# Patient Record
Sex: FEMALE | Race: BLACK OR AFRICAN AMERICAN
[De-identification: names, ages, dates, MRNs, and addresses within clinical notes are randomized per-mention and may not be internally consistent; named-entity substitution may affect disease eponyms.]

---

## 2020-11-10 ENCOUNTER — HOSPITAL ENCOUNTER (EMERGENCY)
Dept: HOSPITAL 97 - ER | Age: 30
Discharge: HOME | End: 2020-11-10
Payer: COMMERCIAL

## 2020-11-10 VITALS — TEMPERATURE: 97.3 F

## 2020-11-10 VITALS — OXYGEN SATURATION: 98 % | SYSTOLIC BLOOD PRESSURE: 119 MMHG | DIASTOLIC BLOOD PRESSURE: 70 MMHG

## 2020-11-10 DIAGNOSIS — J20.9: Primary | ICD-10-CM

## 2020-11-10 DIAGNOSIS — Z20.828: ICD-10-CM

## 2020-11-10 PROCEDURE — 71045 X-RAY EXAM CHEST 1 VIEW: CPT

## 2020-11-10 PROCEDURE — 87804 INFLUENZA ASSAY W/OPTIC: CPT

## 2020-11-10 PROCEDURE — 93005 ELECTROCARDIOGRAM TRACING: CPT

## 2020-11-10 PROCEDURE — 99285 EMERGENCY DEPT VISIT HI MDM: CPT

## 2020-11-10 PROCEDURE — 96372 THER/PROPH/DIAG INJ SC/IM: CPT

## 2020-11-10 NOTE — EDPHYS
Physician Documentation                                                                           

 [facility]                                                                 

Name: Bozena Crenshaw                                                                             

Age: 30 yrs                                                                                       

Sex: Female                                                                                       

: 1990                                                                                   

MRN: [id number]                                                                                   

Arrival Date: 11/10/2020                                                                          

Time: 10:46                                                                                       

Account#: [id number]                                                                            

Bed 18                                                                                            

Private MD:                                                                                       

ED Physician Scooter Yeager                                                                      

HPI:                                                                                              

11/10                                                                                             

11:57 This 30 yrs old Black Female presents to ER via Wheelchair with complaints of Chest     snw 

      Pain.                                                                                       

11:57 Onset: The symptoms/episode began/occurred suddenly, last pm, pt states she feels like  snw 

      she is having an anxiety attack but her chest is way tighter. Associated signs and          

      symptoms: The patient has no apparent associated signs or symptoms. Modifying factors:      

      The patient symptoms are alleviated by nothing. The patient has not experienced similar     

      symptoms in the past. It is unknown whether or not the patient has recently seen a          

      physician.                                                                                  

                                                                                                  

OB/GYN:                                                                                           

11:02 LMP 2020                                                                           ca1 

                                                                                                  

Historical:                                                                                       

- Allergies:                                                                                      

11:02 "anesthesia";                                                                           ca1 

- Home Meds:                                                                                      

11:02 None [Active];                                                                          ca1 

- PMHx:                                                                                           

11:02 None;                                                                                   ca1 

- PSHx:                                                                                           

11:02 Tubal ligation;                                                                         ca1 

                                                                                                  

- Immunization history:: Adult Immunizations up to date, Flu vaccine is not up to date.           

- Social history:: Smoking status: Patient denies any tobacco usage or history of.                

                                                                                                  

                                                                                                  

ROS:                                                                                              

11:57 Constitutional: Negative for fever, chills, and weight loss, Eyes: Negative for injury, snw 

      pain, redness, and discharge, ENT: Negative for injury, pain, and discharge, Neck:          

      Negative for injury, pain, and swelling, Respiratory: Negative for cough, wheezing,         

      positive for shortness of breath and pleuritic chest pain, Abdomen/GI: Negative for         

      abdominal pain, nausea, vomiting, diarrhea, and constipation, Back: Negative for injury     

      and pain, : Negative for injury, bleeding, discharge, and swelling, MS/Extremity:         

      Negative for injury and deformity, Skin: Negative for injury, rash, and discoloration,      

      Neuro: Negative for headache, weakness, numbness, tingling, and seizure, Psych:             

      Negative for depression, anxiety, suicide ideation, homicidal ideation, and                 

      hallucinations.                                                                             

11:57 Cardiovascular: Positive for chest pain, of the anterior aspect of right upper chest.       

                                                                                                  

Exam:                                                                                             

11:57 Constitutional:  This is a well developed, well nourished patient who is awake, alert,  snw 

      and in no acute distress. Head/Face:  Normocephalic, atraumatic. Eyes:  Pupils equal        

      round and reactive to light, extra-ocular motions intact.  Lids and lashes normal.          

      Conjunctiva and sclera are non-icteric and not injected.  Cornea within normal limits.      

      Periorbital areas with no swelling, redness, or edema. ENT:  Nares patent. No nasal         

      discharge, no septal abnormalities noted.  Tympanic membranes are normal and external       

      auditory canals are clear.  Oropharynx with no redness, swelling, or masses, exudates,      

      or evidence of obstruction, uvula midline.  Mucous membranes moist. Neck:  Trachea          

      midline, no thyromegaly or masses palpated, and no cervical lymphadenopathy.  Supple,       

      full range of motion without nuchal rigidity, or vertebral point tenderness.  No            

      Meningismus. Chest/axilla:  Normal chest wall appearance and motion.  Nontender with no     

      deformity.  No lesions are appreciated. Cardiovascular:  Regular rate and rhythm with a     

      normal S1 and S2.  No gallops, murmurs, or rubs.  Normal PMI, no JVD.  No pulse             

      deficits. Respiratory:  Lungs have equal breath sounds bilaterally, clear to                

      auscultation and percussion.  No rales, rhonchi or wheezes noted.  No increased work of     

      breathing, no retractions or nasal flaring. Abdomen/GI:  Soft, non-tender, with normal      

      bowel sounds.  No distension or tympany.  No guarding or rebound.  No evidence of           

      tenderness throughout. Back:  No spinal tenderness.  No costovertebral tenderness.          

      Full range of motion. Skin:  Warm, dry with normal turgor.  Normal color with no            

      rashes, no lesions, and no evidence of cellulitis. MS/ Extremity:  Pulses equal, no         

      cyanosis.  Neurovascular intact.  Full, normal range of motion. Neuro:  Awake and           

      alert, GCS 15, oriented to person, place, time, and situation.  Cranial nerves II-XII       

      grossly intact.  Motor strength 5/5 in all extremities.  Sensory grossly intact.            

      Cerebellar exam normal.  Normal gait. Psych:  Awake, alert, with orientation to person,     

      place and time.  Behavior, mood, and affect are within normal limits.                       

                                                                                                  

Vital Signs:                                                                                      

10:58  / 89; Pulse 95; Resp 16 S; Temp 97.3(TE); Pulse Ox 100% on R/A; Weight 86.18 kg  ca1 

      (R); Height 5 ft. 0 in. (152.40 cm) (R); Pain 10/10;                                        

12:00  / 72; Pulse 86; Resp 16; Pulse Ox 100% on R/A;                                   ll2 

13:00  / 70; Pulse 76; Resp 16; Pulse Ox 98% on R/A;                                    ll2 

13:45  / 73; Pulse 81; Resp 17; Pulse Ox 99% ;                                          ll2 

10:58 Body Mass Index 37.11 (86.18 kg, 152.40 cm)                                             ca1 

                                                                                                  

MDM:                                                                                              

11:13 Patient medically screened.                                                             lea 

15:48 Data reviewed: vital signs, nurses notes. Data interpreted: Pulse oximetry: on room air snw 

      is 99 %. Interpretation: normal. Counseling: I had a detailed discussion with the           

      patient and/or guardian regarding: the historical points, exam findings, and any            

      diagnostic results supporting the discharge/admit diagnosis, lab results, the need for      

      outpatient follow up, for definitive care. Response to treatment: the patient's             

      symptoms have markedly improved after treatment.                                            

                                                                                                  

11/10                                                                                             

11:13 Order name: Flu; Complete Time: 12:23                                                   snw 

11/10                                                                                             

11:13 Order name: COVID-19                                                                    snw 

11/10                                                                                             

11:13 Order name: Chest Single View XRAY; Complete Time: 11:47                                snw 

11/10                                                                                             

11:13 Order name: EKG; Complete Time: 11:14                                                   snw 

11/10                                                                                             

11:13 Order name: EKG - Nurse/Tech; Complete Time: 12:04                                      snw 

                                                                                                  

EC:55 Rate is 79 beats/min. Rhythm is regular. QRS Axis is Normal. KS interval is normal. QRS snw 

      interval is normal. QT interval is normal. T waves are Normal. Clinical impression:         

      Normal ECG.                                                                                 

                                                                                                  

Administered Medications:                                                                         

12:16 Drug: Pepcid 20 mg Route: PO;                                                           ll2 

13:35 Follow up: Response: No adverse reaction                                                ll2 

12:16 Drug: ZyrTEC - Cetirizine 10 mg Route: PO;                                              ll2 

13:35 Follow up: Response: No adverse reaction                                                ll2 

12:17 Drug: Decadron 10 mg Route: IM; Site: right gluteus;                                    ll2 

13:35 Follow up: Response: No adverse reaction                                                ll2 

12:17 Drug: Zithromax 500 mg Route: PO;                                                       ll2 

13:35 Follow up: Response: No adverse reaction                                                ll2 

                                                                                                  

                                                                                                  

Disposition:                                                                                      

                                                                                             

06:56 Co-signature as Attending Physician, Scooter Yeager MD I agree with the assessment and  lea 

      plan of care.                                                                               

                                                                                                  

Disposition:                                                                                      

11/10/20 13:44 Discharged to Home. Impression: Acute bronchitis, Chest pain, unspecified.         

- Condition is Stable.                                                                            

- Discharge Instructions: Acute Bronchitis, Adult, Nonspecific Chest Pain.                        

- Prescriptions for Zyrtec 10 mg Oral Tablet - take 1 tablet by ORAL route once daily             

  As needed; 20 tablet. Tessalon Perles 100 mg Oral Capsule - take 1 capsule by ORAL              

  route every 8 hours As needed; 15 capsule. Prednisone 20 mg Oral Tablet - take 2                

  tablet by ORAL route once daily for 5 days; 10 tablet. Albuterol Sulfate 90                     

  mcg/actuation - inhale 1-2 puff by INHALATION route every 4-6 hours; 1 Inhaler.                 

  Pepcid 20 mg Oral Tablet - take 1 tablet by ORAL route once daily; 20 tablet.                   

  Zithromax 500 mg Oral Tablet - take 1 tablet by ORAL route once daily for 5 days; 5             

  tablet.                                                                                         

- Work release form, Medication Reconciliation Form, Thank You Letter, Antibiotic                 

  Education, Prescription Opioid Use form.                                                        

- Follow up: Emergency Department; When: As needed; Reason: Worsening of condition.               

  Follow up: Private Physician; When: 2 - 3 days; Reason: Recheck today's complaints,             

  Continuance of care, Re-evaluation by your physician.                                           

                                                                                                  

                                                                                                  

                                                                                                  

Signatures:                                                                                       

Dispatcher [employer]                           Scooter Elliott MD MD cha Waters, Shelly, TEENAP-C                   FNP-Migdaliaw                                                  

Olivia Abbott RN RN ca1 Linscombe, Lacie, RN                    RN   ll2                                                  

                                                                                                  

Corrections: (The following items were deleted from the chart)                                    

11/10                                                                                             

14:05 13:44 11/10/2020 13:44 Discharged to Home. Impression: Acute bronchitis; Chest pain,    ll2 

      unspecified. Condition is Stable. Forms are Medication Reconciliation Form, Thank You       

      Letter, Antibiotic Education, Prescription Opioid Use. Follow up: Emergency Department;     

      When: As needed; Reason: Worsening of condition. Follow up: Private Physician; When: 2      

      - 3 days; Reason: Recheck today's complaints, Continuance of care, Re-evaluation by         

      your physician. snw                                                                         

                                                                                                  

**************************************************************************************************

## 2020-11-10 NOTE — ER
Nurse's Notes                                                                                     

 [facility]                                                                 

Name: Bozena Crenshaw                                                                             

Age: 30 yrs                                                                                       

Sex: Female                                                                                       

: 1990                                                                                   

MRN: [id number]                                                                                   

Arrival Date: 11/10/2020                                                                          

Time: 10:46                                                                                       

Account#: [id number]                                                                            

Bed 18                                                                                            

Private MD:                                                                                       

Diagnosis: Acute bronchitis;Chest pain, unspecified                                               

                                                                                                  

Presentation:                                                                                     

11/10                                                                                             

10:58 Chief complaint: Patient states: Chest pain since last night. States, " my chest hurts  ca1 

      feels like I can't breathe and I have a headache. Everything I do just like walking         

      right here takes all the energy I have" Denies cough, fever. Denies injury. Denies hx       

      of heart conditions. Pain is constant, worse with movement and breathing in.                

      Coronavirus screen: Client denies travel out of the [place] in the last 14 days. fatigue,      

      shortness of breath, Client presents with at least one sign or symptom that may             

      indicate coronavirus-19. Standard/surgical mask placed on the client. Provider              

      contacted for isolation considerations. The client denies any previous COVID testing.       

      Ebola Screen: Patient negative for fever greater than or equal to 101.5 degrees             

      Fahrenheit, and additional compatible Ebola Virus Disease symptoms Patient denies           

      exposure to infectious person. Patient denies travel to an Ebola-affected area in the       

      21 days before illness onset. No symptoms or risks identified at this time. Initial         

      Sepsis Screen: Does the patient meet any 2 criteria? No. Patient's initial sepsis           

      screen is negative. Does the patient have a suspected source of infection? No.              

      Patient's initial sepsis screen is negative. Risk Assessment: Do you want to hurt           

      yourself or someone else? Patient reports no desire to harm self or others. Onset of        

      symptoms was November 10, 2020.                                                             

10:58 Method Of Arrival: Wheelchair                                                           ca1 

10:58 Acuity: MALIKA 3                                                                           ca1 

                                                                                                  

OB/GYN:                                                                                           

11:02 LMP 2020                                                                           ca1 

                                                                                                  

Historical:                                                                                       

- Allergies:                                                                                      

11:02 "anesthesia";                                                                           ca1 

- Home Meds:                                                                                      

11:02 None [Active];                                                                          ca1 

- PMHx:                                                                                           

11:02 None;                                                                                   ca1 

- PSHx:                                                                                           

11:02 Tubal ligation;                                                                         ca1 

                                                                                                  

- Immunization history:: Adult Immunizations up to date, Flu vaccine is not up to date.           

- Social history:: Smoking status: Patient denies any tobacco usage or history of.                

                                                                                                  

                                                                                                  

Screenin:10 Abuse screen: Denies threats or abuse. Denies injuries from another. Nutritional        jl7 

      screening: No deficits noted. Tuberculosis screening: No symptoms or risk factors           

      identified. Fall Risk Gait- Weak (10 pts.). Total Goodman Fall Scale indicates No Risk        

      (0-24 pts).                                                                                 

                                                                                                  

Assessment:                                                                                       

11:00 General: Appears in no apparent distress. uncomfortable, Behavior is calm, cooperative, ll2 

      appropriate for age. Pain: Complains of pain in chest and anterior aspect of right          

      upper chest.                                                                                

11:00 Pain: Pain does not radiate. Pain began 1 day ago. Neuro: Level of Consciousness is     ll2 

      awake, alert, obeys commands, Oriented to person, place, time, situation.                   

      Cardiovascular: Patient's skin is warm and dry. Respiratory: Airway is patent               

      Respiratory effort is even, unlabored, Respiratory pattern is regular, symmetrical. GI:     

      No signs and/or symptoms were reported involving the gastrointestinal system. : No        

      signs and/or symptoms were reported regarding the genitourinary system. EENT: No signs      

      and/or symptoms were reported regarding the EENT system. Derm: Skin is intact, is           

      healthy with good turgor, Skin is dry, Skin is normal, Skin temperature is warm.            

      Musculoskeletal: Circulation, motion, and sensation intact. Range of motion: intact in      

      all extremities.                                                                            

12:00 Reassessment: Patient and/or family updated on plan of care and expected duration. Pain ll2 

      level reassessed. Patient is alert, oriented x 3, equal unlabored respirations, skin        

      warm/dry/pink.                                                                              

13:34 Reassessment: Patient and/or family updated on plan of care and expected duration. Pain ll2 

      level reassessed. Patient is alert, oriented x 3, equal unlabored respirations, skin        

      warm/dry/pink.                                                                              

13:41 Reassessment: PT STATES HER PAIN IS DECREASED AND SHE FEELS A BETTER AFTER THE          ll2 

      MEDICATIONS ADMINISTERED. ERP NOTIFIED.                                                     

                                                                                                  

Vital Signs:                                                                                      

10:58  / 89; Pulse 95; Resp 16 S; Temp 97.3(TE); Pulse Ox 100% on R/A; Weight 86.18 kg  ca1 

      (R); Height 5 ft. 0 in. (152.40 cm) (R); Pain 10/10;                                        

12:00  / 72; Pulse 86; Resp 16; Pulse Ox 100% on R/A;                                   ll2 

13:00  / 70; Pulse 76; Resp 16; Pulse Ox 98% on R/A;                                    ll2 

13:45  / 73; Pulse 81; Resp 17; Pulse Ox 99% ;                                          ll2 

10:58 Body Mass Index 37.11 (86.18 kg, 152.40 cm)                                             ca1 

                                                                                                  

ED Course:                                                                                        

10:46 Patient arrived in ED.                                                                  ag5 

11:01 Triage completed.                                                                       ca1 

11:02 Arm band placed on right wrist.                                                         ca1 

11:03 Negar Villalobos FNP-C is PHCP.                                                          snw 

11:03 Scooter Yeager MD is Attending Physician.                                             snw 

11:21 Peggy Ashby RN is Primary Nurse.                                                  ll2 

11:35 EKG done, by ED staff, reviewed by Negar METZGER.                                 jl7 

11:36 Chest Single View XRAY In Process Unspecified.                                          EDMS

12:10 Patient has correct armband on for positive identification. Placed in gown. Bed in low  jl7 

      position. Call light in reach. Side rails up X2. Cardiac monitor on. Pulse ox on. NIBP      

      on.                                                                                         

12:10 Patient maintains SpO2 saturation greater than 95% on room air.                         jl7 

14:00 No provider procedures requiring assistance completed. Patient did not have IV access   ll2 

      during this emergency room visit.                                                           

                                                                                                  

Administered Medications:                                                                         

12:16 Drug: Pepcid 20 mg Route: PO;                                                           ll2 

13:35 Follow up: Response: No adverse reaction                                                ll2 

12:16 Drug: ZyrTEC - Cetirizine 10 mg Route: PO;                                              ll2 

13:35 Follow up: Response: No adverse reaction                                                ll2 

12:17 Drug: Decadron 10 mg Route: IM; Site: right gluteus;                                    ll2 

13:35 Follow up: Response: No adverse reaction                                                ll2 

12:17 Drug: Zithromax 500 mg Route: PO;                                                       ll2 

13:35 Follow up: Response: No adverse reaction                                                ll2 

                                                                                                  

                                                                                                  

Outcome:                                                                                          

13:44 Discharge ordered by MD.                                                                snw 

14:00 Discharged to home ambulatory.                                                          ll2 

14:00 Condition: stable                                                                           

14:00 Discharge instructions given to patient, Instructed on discharge instructions, follow       

      up and referral plans. medication usage.                                                    

14:05 Patient left the ED.                                                                    ll2 

                                                                                                  

Addendum:                                                                                         

2020                                                                                        

     12:31 Addendum: COVID-19 Result: Negative result given to RN to notify pt. Contacted by: Soraida Infante. Notified pt of negative COVID 19 swab results. Pt advised that even with a      

           negative test result they should remain in isolation until symptom free for 3 days     

           without medication. Pt also advised to return to the ED for worsening symptoms.        

                                                                                                  

Signatures:                                                                                       

Dispatcher [employer]                           EDMS                                                 

Whitney Infante, Negar Camarena RN, FNP-C                   FNP-Csnw                                                  

Jasmin Vital RN                        RN   jl7                                                  

Olivia Abbott RN                        RN   ca1                                                  

Jyoti Duran                                5                                                  

Peggy Ashby RN                    RN   ll2                                                  

                                                                                                  

**************************************************************************************************

## 2020-11-10 NOTE — RAD REPORT
EXAM DESCRIPTION:  RAD - Chest Single View - 11/10/2020 11:36 am

 

CLINICAL HISTORY:  CHEST PAIN

Chest pain.

 

COMPARISON:  CHEST SINGLE VIEW dated 10/27/2009

 

FINDINGS:  Portable technique limits examination quality.

 

The lungs are grossly clear. The heart is normal in size. No displaced fractures.

 

IMPRESSION:  No acute intrathoracic process suspected.

## 2020-11-11 NOTE — EKG
Test Date:    2020-11-10               Test Time:    11:48:51

Technician:   FRANCESCO                                    

                                                     

MEASUREMENT RESULTS:                                       

Intervals:                                           

Rate:         79                                     

NJ:           162                                    

QRSD:         84                                     

QT:           348                                    

QTc:          399                                    

Axis:                                                

P:                                                   

NJ:           162                                    

QRS:          52                                     

T:            45                                     

                                                     

INTERPRETIVE STATEMENTS:                                       

                                                     

Normal sinus rhythm

Normal ECG

Compared to ECG 01/02/2012 00:02:04

Sinus arrhythmia no longer present



Electronically Signed On 11-11-20 07:24:29 CST by Anil Smith

## 2024-12-16 ENCOUNTER — HOSPITAL ENCOUNTER (EMERGENCY)
Dept: HOSPITAL 97 - ER | Age: 34
Discharge: HOME | End: 2024-12-16
Payer: COMMERCIAL

## 2024-12-16 VITALS — TEMPERATURE: 98 F

## 2024-12-16 VITALS — OXYGEN SATURATION: 100 %

## 2024-12-16 VITALS — SYSTOLIC BLOOD PRESSURE: 123 MMHG | DIASTOLIC BLOOD PRESSURE: 87 MMHG

## 2024-12-16 DIAGNOSIS — K80.80: Primary | ICD-10-CM

## 2024-12-16 LAB
ALBUMIN SERPL BCP-MCNC: 3.7 G/DL (ref 3.4–5)
ALBUMIN/GLOB SERPL: 1.1 {RATIO} (ref 1.1–1.8)
ALP SERPL-CCNC: 81 U/L (ref 45–117)
ALT SERPL W P-5'-P-CCNC: 38 U/L (ref 13–56)
ANION GAP SERPL CALC-SCNC: 9.6 MEQ/L (ref 5–15)
AST SERPL W P-5'-P-CCNC: 43 U/L (ref 15–37)
BUN BLD-MCNC: 12 MG/DL (ref 7–18)
GLOBULIN SER CALC-MCNC: 3.4 G/DL (ref 2.3–3.5)
GLUCOSE SERPLBLD-MCNC: 124 MG/DL (ref 74–106)
HCT VFR BLD CALC: 42.9 % (ref 36–45)
HGB BLD-MCNC: 14.6 G/DL (ref 12–15)
LIPASE SERPL-CCNC: 21 U/L (ref 13–75)
LYMPHOCYTES # SPEC AUTO: 2.7 K/UL (ref 0.7–4.9)
MCH RBC QN AUTO: 29.5 PG (ref 27–35)
MCHC RBC AUTO-ENTMCNC: 34.1 G/DL (ref 32–36)
MCV RBC: 86.5 FL (ref 80–100)
NRBC # BLD: 0 10*3/UL (ref 0–0)
NRBC BLD AUTO-RTO: 0.1 % (ref 0–0)
PMV BLD: 7.6 FL (ref 7.6–11.3)
POTASSIUM SERPL-SCNC: 3.6 MEQ/L (ref 3.5–5.1)
RBC # BLD: 4.96 M/UL (ref 3.86–4.86)
SQUAMOUS URNS QL MICRO: <5 /HPF
UA COMPLETE W REFLEX CULTURE PNL UR: (no result)
UA DIPSTICK W REFLEX MICRO PNL UR: (no result)
WBC # BLD AUTO: 12.1 THOU/UL (ref 4.3–10.9)

## 2024-12-16 PROCEDURE — 80053 COMPREHEN METABOLIC PANEL: CPT

## 2024-12-16 PROCEDURE — 81025 URINE PREGNANCY TEST: CPT

## 2024-12-16 PROCEDURE — 96375 TX/PRO/DX INJ NEW DRUG ADDON: CPT

## 2024-12-16 PROCEDURE — 96372 THER/PROPH/DIAG INJ SC/IM: CPT

## 2024-12-16 PROCEDURE — 81001 URINALYSIS AUTO W/SCOPE: CPT

## 2024-12-16 PROCEDURE — 83690 ASSAY OF LIPASE: CPT

## 2024-12-16 PROCEDURE — 96374 THER/PROPH/DIAG INJ IV PUSH: CPT

## 2024-12-16 PROCEDURE — 96361 HYDRATE IV INFUSION ADD-ON: CPT

## 2024-12-16 PROCEDURE — 85025 COMPLETE CBC W/AUTO DIFF WBC: CPT

## 2024-12-16 PROCEDURE — 74177 CT ABD & PELVIS W/CONTRAST: CPT

## 2024-12-16 PROCEDURE — 99284 EMERGENCY DEPT VISIT MOD MDM: CPT

## 2024-12-16 PROCEDURE — 36415 COLL VENOUS BLD VENIPUNCTURE: CPT

## 2024-12-16 NOTE — XMS REPORT
Continuity of Care Document



                          Created on: 2024





VICKEY CRENSHAW

External Reference #: [id number]

: 1990

Sex: Female



Demographics





                                        [place]  [address]

 

                                        Home Phone          [phone]

 

                                        Work Phone          [phone]

 

                                        Mobile Phone        [phone]

 

                                        Email Address       DECLINED

 

                                        Preferred Language  English

 

                                        Marital Status      Unknown

 

                                        Taoist Affiliation Unknown

 

                                        Race                Unknown

 

                                        Additional Race(s)  Unavailable

White

 

                                        Ethnic Group        Unknown





Author





                                        Name                Unknown

 

                                        Address             [address]

[place]  [address]

 

                                        [facility]

[facility]connect

 

                                        Address             [address]

[address]

[place]  [address]

 

                                        Phone               [phone]





Support





                          Name         Relationship Address      Phone

 

                                Vickey Crenshaw Personal Relationship [address], [place]  [address] [phone]

 

                                Karlee Crenshaw  Emergency Contact [address] [place]  [address]                     [phone]

 

                                Vickey Crenshaw Personal Relationship [address] [place]  [address]                 [phone]

 

                                Unavailable     Personal Relationship [address] [place]  [address]                      Unavailable

 

                                1               JALAYAH         [address] [place]  [address]                      Unavailable

 

                                2               Personal Relationship [address] [place]  [address]                      Unavailable

 

                          PING CRENSHAW DTR          Unknown      Unavailabl

e

 

                                CHANEL CRENSHAW               [address]

[place]  [address]                     [phone]





Care Team Providers





                                Care Team Member Name Role            Phone

 

                                ARTHUR LUCIANO Primary Care Physician Unavaila

Niyah Trejo  Attending Clinician Unavailable

 

                                RUFINA SEQUEIRA Attending Clinician Unavailab

EDWIN Potter Attending Clinician Unavailable

 

                                STEFAN DUNBAR    Attending Clinician Unavailable

 

                                DENISE HAJI   Attending Clinician Unavailable

 

                                PAULINA OLGUIN Attending Clinician UnaTOMMIE Myers Attending Clinician Unavailab

ADINA Portillo Attending Clinician Unavaila

SMITHA Espitia    Attending Clinician Unavailable

 

                                LAB45           Attending Clinician Unavailable

 

                                ELLIOTT PINO Attending Clinician Un

available

 

                                MD EITAN Attending Clinician Unavailab

FREDDY Ross Attending Clinician Unavaila

ble

 

                                LAB90           Attending Clinician Unavailable

 

                                LEROY WAGNER   Attending Clinician Unavailable

 

                                Leander Holcomb MD Attending Clinician [phone]

 

                                Leroy Wagner DO Attending Clinician [phone]

 

                                GC_GCBZW_Shellya_S Attending Clinician UnavailCHANTELLE Yao Attending Clinician UnavailSTEF Ames  Attending Clinician Unavailable

 

                                Rian DICKINSONP, Stef DUPONT Attending Clinician [phone]

[phone]

 

                                Doctor Unassigned, No Name Attending Clinician U

MAGDALENE Ling Attending Clinician Unavailwilian Peralta FNP, Magdalene GUERRERO Attending Clinician [phone]

 

                                [facility] Yris DUKE Attending Clinician +

[phone]

 

                                YRIS AGUIRRE Attending Clinician Unavail

leonides Hernandes FNP, Richy DUPONT Attending Clinician +

[phone]

 

                                Lab, [facility] I Attending Clinician Unavailab Lopez, Pau LAY Attending Clinician +

[phone]

 

                                PAU HEALY Attending Clinician Unavailab

Nidia Dodson Attending Clinician [phone]



 

                                NIDIA MADDEN Attending Clinician Unavailable

 

                                GABRIELA COTTER Attending Clinician Unavail

able

 

                                TERRENCE WU Attending Clinician UnavailHAMIDA Layton Attending Clinician LEROY Maurer   Admitting Clinician Unavailable

 

                                Leroy Wagner DO Admitting Clinician [phone]

 

                                GC_GCBZW_Shellya_S Admitting Clinician STEF Larry  Admitting Clinician Unavailable

 

                                GABRIELA COTTER Admitting Clinician Unavail

TERRENCE Ov[place]o Admitting Clinician HAMIDA Rhodes Admitting Clinician Juana toussaint







Payers





                    Payer Name Policy Type Policy Number Effective Date Expirati

on Date Source

 

                                                    [employer] [facility] 

S O  [address] ON     9                   [id number]        2023 

00:00:00                                            

 

                                                    [employer] 

OUT OF NETWORK                          [id number]        2023 

00:00:00                                            

 

                          [facility]                 [id number]    2021 

00:00:00                                            







Problems





                                                    Condition 

Name                                    Condition 

Details                                 Condition 

Category                  Status                    Onset 

Date                                    Resolution 

Date                                    Last 

Treatment 

Date                                    Treating 

Clinician                 Comments                  Source

 

                                                    Abdominal 

pain, 

unspecifie

d 

abdominal 

location                                Abdominal 

pain, 

unspecifie

d 

abdominal 

location            Disease             Active              2023 

00:00:

00                                                               [facility]

 

                                                    History of 

tubal 

ligation                                History of 

tubal 

ligation            Disease             Active               

00:00:

00                                                              Overview: 

Formattin

g of this 

note 

might be 

different 

from the 

original.

                                    [facility]

 

                                                    Hydrosalpi

nx                                      Hydrosalpi

nx                  Disease             Active               

00:00:

00                                                               [facility]

 

                                                    Pseudochol

inesterase 

deficiency                              Pseudochol

inesterase 

deficiency                Disease                   Recurre

nce                                      

00:00:

00                                                               [facility]

 

                                                    Morbid 

obesity                                 Morbid 

obesity             Disease             Active               

00:00:

00                                                               [facility]

 

                                        [id number]           BMI 

40.0-44.9, 

adult   Problem Active                                          [facility]

 

                                        [id number]            PTSD 

(post-trau

matic 

stress 

disorder) Problem Active                                          [facility]

 

            [id number] Pica  Problem Active                               [facility]

 

                                        [id number]           History of 

uterine 

fibroid Problem Active                                          [facility]

 

                                        [id number]           Pure 

hyperchole

sterolemia Problem Active                                          [facility]







Allergies, Adverse Reactions, Alerts





                                                    Allergy 

Name                                    Allergy 

Type            Status          Severity        Reaction(s)     Onset 

Date                                    Inactive 

Date                                    Treating 

Clinician                 Comments                  Source

 

                                                    Succinyl

choline                                 Propensi

ty to 

adverse 

reaction

s               Active                          Other            

00:00:

00                                                          Pseudocho

linestera

se 

deficienc

y 

(Prolonge

d 

paralysis 

of 1-2 

hours)                                  [facility] 

- 

Externa

l

 

                                                    SUCCINYL

CHOLINE                                 DRUG 

INGREDI         Active          High            Other-Cmnt       

00:00:

00                                                              [facility]

 

                                                    Succinyl

choline                                 Propensi

ty to 

adverse 

reaction

s to 

drug                Active                                  Other - See 

comments                                 

00:00:

00                                                          Pseudocho

linestera

se 

deficienc

y 

(Prolonge

d 

paralysis 

of 1-2 

hours)                                  [facility]







Social History





                    Social Habit Start Date Stop Date Quantity  Comments  Source

 

                    Sexual orientation                                         [facility] - 

External

 

                    ASSERTION                     Not pregnant           [facility] - 

External

 

                                                    History SDOH 

Alcohol Frequency                                                     [facility]

 

                                                    History SDOH 

Alcohol Std Drinks                                                     [facility]

 

                                                    History SDOH 

Alcohol Binge                                                     [facility]

 

                                                    Exposure to 

SARS-CoV-2 (event)                           Not sure                  [facility]

 

                                                    History of Tobacco 

Use                                                              [facility]

 

[facility]

 

                                                    Alcoholic beverage 

intake                                  2024 

00:00:00                                2024 

00:00:00                                Current drinker 

of alcohol 

(finding)                                           [facility] - 

External

 

                                                    History of Social 

function                                2024 

00:00:00                                2024 

00:00:00                                                    [facility] - 

External

 

                                        Alcohol Comment     2024 

00:00:00                                2024 

00:00:00            Socially                                [facility] - 

External

 

                                                    Tobacco use and 

exposure                                2023 

00:00:00                                2023 

00:00:00                                Smokeless 

tobacco non-user                                    [facility] - 

External

 

                                        Alcohol intake      2023 

00:00:00                                2023 

00:00:00                                Current drinker 

of alcohol 

(finding)                                           [facility]

 

                                        Sex                 2017 

13:54:15                                2017 

13:54:15            Female (finding)                        [facility] - 

External

 

                                                    Sex assigned at 

birth                                   1990 

00:00:00                                1990 

00:00:00            F                                       [facility] - 

External







                          Smoking Status Start Date   Stop Date    Source

 

                          Never smoked tobacco                           [facility] - External







Medications





                                                    Ordered 

Medication 

Name                                    Filled 

Medication 

Name                                    Start 

Date                                    Stop 

Date                                    Current 

Medication?                             Ordering 

Clinician       Indication      Dosage          Frequency       Signature 

(SIG)               Comments            Components          Source

 

                                                    CLINDAMYCIN 

PHOSPHATE 

VAGINAL 2 % 

vaginal 

Cream                                               2024 

00:00:

00                  Yes                 [id number]                     Please use

 

nightly 

for 7 

nights..                                                    Sange[facility] sanderson

 

                                                    Ferrous 

Fumarate 

(Iron) 18 

MG oral Tab 

CR                                                  2024 

08:06:

09                  Yes                                               1 tab Oral

 

for 14 

days                                                        Sange[facility] sanderson

 

                                                    predniSONE 

(DELTASONE) 

20 MG oral 

tablet                                              2024 

11:08:

26                                      2024-

10-30 

00:00

:00        No                                                     TAKE 2 

TABLETS BY 

MOUTH 

EVERY DAY 

X5 DAYS                                                     Sange[facility] sanderson

 

                                                    Cefixime 

400 MG oral 

Capsule                                             2024 

11:08:

23                                      2024-

10-30 

00:00

:00        No                                                     TAKE 1 C 

PO ONCE                                                     Sange[facility] sanderson

 

                                                    Albuterol 

 (90 

Base) 

MCG/ACT IN 

AERS                                                2024 

11:08:

14                                      2024-

10-30 

00:00

:00        No                                                     INHALE 1 2 

PUFFS BY 

INHALATION 

ROUTE 

EVERY 4 6 

HOURS                                                       Sangeeta sanderson

 

                                                    Metronidazo

le (Flagyl) 

500 MG oral 

Tablet                                              -08 

00:00:

00                                       

04:59

:00        No                    [id number]  500mg      Q.5D       Take 1 

tablet 

(500 mg 

total) by 

mouth 2 

times 

daily for 

7 days.                                                     Sangeeta sanderson

 

                                                    Ciprofloxac

in-dexAMETH

asone 

0.3-0.1 % 

otic 

Suspension                                          - 

00:00:

00                                      2024-

10-30 

00:00

:00                 No                                      [id number]

[id number]               3[drp]              Q.5D                Place 3 

drops into 

both ears 

2 times 

daily.                                                      Sangeeta sanderson

 

                                                    Metronidazo

le (Flagyl) 

500 MG oral 

Tablet                                              -24 

00:00:

00                                      2024-

10-30 

00:00

:00        No                               500mg      Q.5D       Take 1 

tablet 

(500 mg 

total) by 

mouth 2 

times 

daily.                                                      Sangeeta sanderson

 

                                                    Scopolamine 

(TRANSDERM-

SCOP) 1 

MG/3DAYS 

transdermal 

PATCH 72 HR                                         2024-0

3-18 

00:00:

00                                       

00:00

:00             No                              [id number]        1{patch

}                                                   Place 1 

patch onto 

the skin 

once a 

week 

Please 

apply 1 

patch to 

area 

behind the 

ear every 

3 days for 

motion 

sickness 

if needed.                                                  Sangeeta sanderson

 

                                                    Tramadol 

HCl 

(ULTRAM) 50 

MG oral 

Tablet                                              2023

2-03 

00:00:

00                                       

05:59

:00        No                               50mg       Q.25D      Take 1 

tablet (50 

mg total) 

by mouth 

every 6 

hours as 

needed.                                                     Sangeeta sanderson

 

                                                    ketorolac 

(TORADOL) 

injection 

15 mg                                               2023

2- 

20:00:

00                                       

19:59

:00        No                               15mg                  15 mg, 

Slow IV 

Push, 

Q6HPRN, 

Starting 

on Sat 

23 at 

1400, 

Until Mon 

23 at 

1359, 

Routine, 

Alternate 

with [place] 

for pain 

scale 4-6                                                   [facility]

 

                                                    morpHINE (4 

mg/mL) 

injection 4 

mg                                                  2023 

19:38:

15                  Yes                           4mg                 4 mg, Slow

 

IV Push, 

Q4HPRN, 

Starting 

on Sat 

23 at 

1338, 

Until 

Discontinu

ed, ASAP, 

Pain 

(scale 

7-10)                                                       [facility]

 

                                                    proMETHazin

e 

(PHENERGAN) 

12.5 mg in 

NS 50 mL IV 

piggyback 

(CNR)                                               2023 

19:25:

41                  Yes                           12.5mg              12.5 mg, 

IV 

Piggyback, 

at 200 

mL/hr 

Administer 

over 15 

Minutes, 

Q4HPRN, 

Starting 

on Sat 

23 at 

1325, 

Until 

Discontinu

ed, 

Routine, 

N/V 

unresponsi

ve to 

Ondansetro

n                                                           [facility]

 

                                                    piperacilli

n-tazobacta

m (ZOSYN) 

3.375 g in 

NaCl 0.9% 

(NS) 100 mL 

MINI-BAG                                            2023 

03:00:

00                                       

02:59

:00        No                               3.375g                3.375 g, 

IV 

Piggyback, 

Q8H ABX, 

15 doses, 

First dose 

on 23 at 

2100, Last 

dose on 

23 at 

1300, 

Administer 

over 4 

Hours, 100 

mL<br>Reas

on for 

Anti-Infec

tive: 

Empiric 

Therapy 

for 

Suspected 

Infection<

br>Empiric 

Therapy 

Site: 

Abdominal<

br>Duratio

n of 

therapy: 5 

days                                                        [facility]

 

                                                    enoxaparin 

(LOVENOX) 

injection 

40 mg                                               2023 

23:00:

00                  Yes                           40mg                40 mg, 

Subcutaneo

us, DAILY, 

First dose 

on 23 at 

1700, 

Until 

Discontinu

ed, 

Routine                                                     [facility]

 

                                                    proMETHazin

e 

(PHENERGAN) 

12.5 mg in 

NS 50 mL IV 

piggyback 

(CNR)                                               2023 

22:45:

00                                       

22:37

:33        No                               12.5mg                12.5 mg, 

IV 

Piggyback, 

at 200 

mL/hr 

Administer 

over 15 

Minutes, 

ONCE, 1 

dose, On 

23 at 

1645, 

Routine                                                     [facility]

 

                                                    piperacilli

n-tazobacta

m (ZOSYN) 

3.375 g in 

NaCl 0.9% 

(NS) 100 mL 

MINI-BAG                                            2023 

19:15:

00                                       

20:55

:00        No                               3.375g                3.375 g, 

IV 

Piggyback, 

ONCE, 1 

dose, On 

23 at 

1315, 

Administer 

over 30 

Minutes, 

100 

mL<br>Reas

on for 

Anti-Infec

tive: 

Empiric 

Therapy 

for 

Suspected 

Infection<

br>Empiric 

Therapy 

Site: 

Abdominal<

br>Duratio

n of 

therapy: 

72 hours                                                    [facility]

 

                                                    lactated 

ringers IV 

infusion 

1,000 mL                                            2023 

18:30:

00                  Yes                           1000mL              at 125 

mL/hr, 

1,000 mL, 

IV 

Infusion, 

CONTINUOUS

, Starting 

on 23 at 

1230, 

Until 

Discontinu

ed, 

Routine                                                     [facility]

 

                                                    morpHINE (4 

mg/mL) 

injection 4 

mg                                                  2023 

18:30:

00                                       

17:55

:00        No                               4mg                   4 mg, Slow 

IV Push, 

ONCE, 1 

dose, On 

23 at 

1230, ASAP                                                  [facility]

 

                                                    ondansetron 

(ZOFRAN 

(PF)) 

injection 4 

mg                                                  2023 

18:24:

34                  Yes                           4mg                 4 mg, Slow

 

IV Push, 

Q6HPRN, 

Starting 

on 23 at 

1224, 

Until 

Discontinu

ed, 

Routine, 

Nausea and 

Vomiting 

(N/V)                                                       [facility]

 

                                                    morpHINE (4 

mg/mL) 

injection 4 

mg                                                  2023 

18:24:

31                                       

18:23

:31        No                               4mg                   4 mg, Slow 

IV Push, 

Q4HPRN, 

Starting 

on 23 at 

1224, 

Until Sat 

23 at 

1223, 

Routine, 

Pain 

(scale 

7-10)                                                       [facility]

 

                                                    HYDROcodone

-acetaminop

hen (NORCO 

5) 5-325 mg 

tablet 1 

tablet                                              2023 

18:24:

29                                       

18:23

:29        No                               1{tbl}                1 tablet, 

Oral, 

Q6HPRN, 

Starting 

on 23 at 

1224, 

Until Sun 

12/3/23 at 

1223, 

Routine, 

Pain 

(scale 

4-6)                                                        [facility]

 

                                                    ketorolac 

(TORADOL) 

injection 

30 mg                                               2023 

16:15:

00                                       

15:28

:00        No                               30mg                  30 mg, 

Slow IV 

Push, 

ONCE, 1 

dose, On 

23 at 

1015, ASAP                                                  [facility]

 

                                                    NaCl 0.9% 

(NS) bolus 

infusion 

1,000 mL                                            2023 

14:30:

00                                       

20:25

:00        No                               1000mL                at 999 

mL/hr, 

1,000 mL, 

IV 

Infusion, 

ONCE, 1 

dose, On 

23 at 

0830, ASAP                                                  [facility]

 

                                                    morpHINE (4 

mg/mL) 

injection 4 

mg                                                  2023 

14:30:

00                                       

14:05

:00        No                               4mg                   4 mg, Slow 

IV Push, 

ONCE, 1 

dose, On 

23 at 

0830, ASAP                                                  [facility]

 

                                                    iopamidol 

(ISOVUE 

370-500 mL) 

injection 

90 mL                                               2023 

14:15:

00                                       

14:30

:00        No                    [id number]   90mL                  90 mL, 

Intravenou

s, ONCE, 1 

dose, On 

23 at 

0830, 

Routine                                                     [facility]

[facility]

 

                                                    famotidine 

(PEPCID 

(PF)) 

injection 

20 mg                                               2023 

13:45:

00                                       

14:05

:00        No                               20mg                  20 mg, 

Slow IV 

Push, 

ONCE, 1 

dose, On 

23 at 

0745, ASAP                                                  [facility]

 

                                                    ondansetron 

(ZOFRAN 

(PF)) 

injection 8 

mg                                                  2023 

13:45:

00                                       

13:59

:00        No                               8mg                   8 mg, Slow 

IV Push, 

ONCE, 1 

dose, On 

23 at 

0745, ASAP                                                  [facility]

 

                                                    Iron 

(Ferrous 

Sulfate) 

325 (65 Fe) 

MG                                      Iron 

(Ferrous 

Sulfate) 

325 (65 Fe) 

MG                                      

2-04 

00:00:

00                        No                                     1{table

t}                        QD                        Iron 

(Ferrous 

Sulfate) 

325 (65 

Fe) MG                                                      

 

                                                    Prazosin 

HCl 2 MG                                Prazosin 

HCl 2 MG                                

2-04 

00:00:

00                        No                                     1{capsu

le_at_b

edtime}                   QD                        Prazosin 

HCl 2 MG                                                    

 

                                                    Iron 

(Ferrous 

Sulfate) 

325 (65 Fe) 

MG                                      Iron 

(Ferrous 

Sulfate) 

325 (65 Fe) 

MG                                      

2-04 

00:00:

00                        No                                     1{table

t}                        QD                        Iron 

(Ferrous 

Sulfate) 

325 (65 

Fe) MG                                                      

 

                                                    Prazosin 

HCl 1 MG                                Prazosin 

HCl 1 MG                                - 

00:00:

00                        No                                     1{capsu

le_at_b

edtime}                   QD                        Prazosin 

HCl 1 MG                                                    

 

                                                    iopamidol 

(ISOVUE 

370-500 mL) 

injection 

120 mL                                              2021-1

1-10 

18:30:

00                                      2021-

11-10 

17:18

:00        No                    [id number]   120mL                 120 mL, 

Intravenou

s, ONCE, 1 

dose, On 

Wed 

11/10/21 

at 1230, 

Routine                                                     [facility]

 

                                                    famotidine 

(PEPCID 

(PF)) 

injection 

20 mg                                               2021-1

1-10 

18:15:

00                                      2021-

11-10 

17:25

:00        No                               20mg                  20 mg, 

Slow IV 

Push, 

ONCE, 1 

dose, On 

Wed 

11/10/21 

at 1215, 

ASAP                                                        [facility]

 

                                                    NaCl 0.9% 

(NS) bolus 

infusion 

1,000 mL                                            2021-1

1-10 

18:15:

00                                      2021-

11-10 

18:38

:00        No                               1000mL                at 999 

mL/hr, 

1,000 mL, 

IV 

Infusion, 

ONCE, 1 

dose, On 

Wed 

11/10/21 

at 1215, 

STAT                                                        [facility]

 

                                                    ketorolac 

(TORADOL) 

injection 

30 mg                                               2021-1

1-10 

18:15:

00                                      2021-

11-10 

17:25

:00        No                               30mg                  30 mg, 

Slow IV 

Push, 

ONCE, 1 

dose, On 

Wed 

11/10/21 

at 1215, 

ASAP<br>Fa

culty 

member 

approving 

Restricted 

medication

: STEF VALDES                                                     [facility]

 

                                                    metoclopram

lorene HCl 

(REGLAN) 

injection 

10 mg                                               2021-1

1-10 

18:15:

00                                      2021-

11-10 

17:25

:00        No                               10mg                  10 mg, 

Slow IV 

Push, 

ONCE, 1 

dose, On 

Wed 

11/10/21 

at 1215, 

ASAP                                                        [facility]

 

                                                    ondansetron 

(ZOFRAN 

ODT) 4 mg 

disintegrat

ing tablet                                          2021-1

1-10 

00:00:

00                  Yes                 [id number]  4mg                 Take 1 

tablet by 

mouth 

every 8 

(eight) 

hours as 

needed for 

Nausea and 

Vomiting 

(N/V).                                                      [facility]

 

                                                    dicyclomine 

10 mg 

capsule                                             2021-1

1-10 

00:00:

00                  Yes                 [id number]  10mg                Take 1 

capsule by 

mouth 4 

(four) 

times 

daily as 

needed for 

Abdominal 

pain.                                                       [facility]

 

                                                    cefdinir 

300 mg 

capsule                                             2021-1

1-10 

00:00:

00                  Yes                 [id number]  300mg               Take 1 

capsule by 

mouth 2 

(two) 

times 

daily.                                                      [facility]

 

                                                    norgestimat

e-ethinyl 

estradioL 

(ORTHO 

TRI-CYCLEN, 

28,) 

0.18/0.215/

0.25 mg-35 

mcg (28) 

tablet                                               

00:00:

00                                       

00:00

:00        No                    [id number]  1{tbl}                Take 1 

tablet by 

mouth 

daily.                                                      [facility]

 

                                                    medroxyPROG

ESTERone 

(DEPO-PROVE

RA) 

injection 

150 mg                                               

18:45:

00                                       

18:44

:00     No              [id number] 150mg                                   [facility]

 

                                                    Prazosin 

HCl 2 MG                                Prazosin 

HCl 2 MG                         No                               1{capsu

le_at_b

edtime}                   QD                        Prazosin 

HCl 2 MG                                                    

 

                                                    Rexulti 0.5 

MG                                      Rexulti 0.5 

MG                               No                               1{table

t}                        QD                        Rexulti 

0.5 MG                                                      

 

                                                    Rexulti 0.5 

MG                                      Rexulti 0.5 

MG                               No                               1{table

t}                        QD                        Rexulti 

0.5 MG                                                      

 

                                                    Iron 

(Ferrous 

Sulfate) 

325 (65 Fe) 

MG                                      Iron 

(Ferrous 

Sulfate) 

325 (65 Fe) 

MG                               No                               1{table

t}                        QD                        Iron 

(Ferrous 

Sulfate) 

325 (65 

Fe) MG                                                      

 

                                                    Prazosin 

HCl 2 MG                                Prazosin 

HCl 2 MG                 No                                      Prazosin 

HCl 2 MG                                                    







Vital Signs





                      Vital Name Observation Time Observation Value Comments   S

ource

 

                                        Body weight         2024 

16:26:00                  92.987 kg                 per pt report, 

taken on home 

scale                                   [facility] - 

External

 

                                        BMI                 2024 

16:26:00            40.04 kg/m2                             [facility] - 

[facility]

 

                                        Heart rate          2024-10-30 

16:04:00            86 /min                                 [facility] - 

External

 

                                        Body temperature    2024-10-30 

16:04:00            35.72 Sarita                               [facility] - 

External

 

                                        Respiratory rate    2024-10-30 

16:04:00            15 /min                                 [facility] Jorgey[facility] - 

External

 

                                        Body height         2024-10-30 

16:04:00            152.4 cm                                [facility] - 

External

 

                                        Body weight         2024-10-30 

16:04:00            94.802 kg                               [facility] - 

External

 

                                        BMI                 2024-10-30 

16:04:00            40.82 kg/m2                             [facility] - 

External

 

                                                    Systolic blood 

pressure                                2024-10-30 

16:04:00            124 mm[Hg]                              [facility] - 

External

 

                                                    Diastolic blood 

pressure                                2024-10-30 

16:04:00            82 mm[Hg]                               [facility] - 

External

 

                                                    Systolic blood 

pressure                                2024 

20:56:00            116 mm[Hg]                              [facility] - 

External

 

                                                    Diastolic blood 

pressure                                2024 

20:56:00            68 mm[Hg]                               [facility] - 

External

 

                                        Heart rate          2024 

20:56:00            95 /min                                 [facility] - 

External

 

                                        Body temperature    2024 

20:56:00            37.06 Sarita                               [facility] - 

External

 

                                        Respiratory rate    2024 

20:56:00            14 /min                                 [facility] - 

External

 

                                        Body height         2024 

20:56:00            152.4 cm                                [facility] - 

External

 

                                        Body weight         2024 

20:56:00            93.895 kg                               [facility] - 

External

 

                                        BMI                 2024 

20:56:00            40.43 kg/m2                             [facility] - 

External

 

                                                    Systolic blood 

pressure                                2024 

16:29:00            114 mm[Hg]                              [facility] - 

External

 

                                                    Diastolic blood 

pressure                                2024 

16:29:00            78 mm[Hg]                               [facility] - 

External

 

                                        Heart rate          2024 

16:29:00            86 /min                                 [facility] - 

External

 

                                        Body height         2024 

16:29:00            152.4 cm                                [facility] - 

External

 

                                        Body weight         2024 

16:29:00            93.895 kg                               [facility] - 

External

 

                                        BMI                 2024 

16:29:00            40.43 kg/m2                             [facility] - 

External

 

                                                    Systolic blood 

pressure                                2024 

14:59:00            108 mm[Hg]                              [facility] - 

External

 

                                                    Diastolic blood 

pressure                                2024 

14:59:00            64 mm[Hg]                               [facility] - 

External

 

                                        Heart rate          2024 

14:59:00            82 /min                                 [facility] - 

External

 

                                        Body temperature    2024 

14:59:00            36.61 Sarita                               [facility] - 

External

 

                                        Respiratory rate    2024 

14:59:00            16 /min                                 [facility] - 

External

 

                                        Body height         2024 

14:59:00            152.4 cm                                [facility] - 

External

 

                                        Body weight         2024 

14:59:00            91.343 kg                               [facility] - 

External

 

                                        BMI                 2024 

14:59:00            39.33 kg/m2                             [facility] - 

External

 

                                                    Oxygen saturation 

in Arterial blood 

by Pulse oximetry                       2024 

14:59:00            98 /min                                 [facility] - 

External

 

                                                    Systolic blood 

pressure                                2023 

20:13:00            110 mm[Hg]                              [facility] - 

External

 

                                                    Diastolic blood 

pressure                                2023 

20:13:00            68 mm[Hg]                               [facility] - 

External

 

                                        Heart rate          2023 

20:13:00            88 /min                                 [facility] - 

External

 

                                        Body temperature    2023 

20:13:00            36.67 Sarita                               [facility] - 

External

 

                                        Respiratory rate    2023 

20:13:00            18 /min                                 [facility] - 

External

 

                                        Body height         2023 

20:13:00            152.4 cm                                [facility] - 

External

 

                                        Body weight         2023 

20:13:00            92.59 kg                                [facility] - 

External

 

                                        BMI                 2023 

20:13:00            39.87 kg/m2                             [facility] Gonzalez - 

External

 

                                                    Oxygen saturation 

in Arterial blood 

by Pulse oximetry                       2023 

20:13:00            99 /min                                 [facility] Gonzalez - 

External

 

                                                    Systolic blood 

pressure                                2023 

12:49:00            126 mm[Hg]                              [facility]

 

                                                    Diastolic blood 

pressure                                2023 

12:49:00            83 mm[Hg]                               [facility]

 

                                        Heart rate          2023 

12:49:00            75 /min                                 [facility]

 

                                        Body temperature    2023 

12:49:00            36.28 Sarita                               [facility]

 

                                        Respiratory rate    2023 

12:49:00            18 /min                                 [facility]

 

                                                    Oxygen saturation 

in Arterial blood 

by Pulse oximetry                       2023 

12:49:00            96 /min                                 [facility]

 

                                        Body weight         2023 

09:21:00            94.484 kg                               [facility]

 

                                        BMI                 2023 

09:21:00            40.68 kg/m2                             [facility]

 

                                        Body height         2023 

12:50:00            152.4 cm                                [facility]

 

                                        height              2022 

15:00:00            61 [in_i]                               [facility]

 

                                        weight              2022 

15:00:00            219.4 [lb_av]                           [facility]

 

                                        temperature         2022 

15:00:00            98.2 [degF]                             [facility]

 

                                        bmi                 2022 

15:00:00            41.45 kg/m2                             [facility]

 

                                        oximetry            2022 

15:00:00            97 %                                    [facility]

 

                                        respiratory rate    2022 

15:00:00            16 /min                                 [facility]

 

                                                    blood pressure 

systolic                                2022 

15:00:00            132 mm[Hg]                              [facility]

 

                                                    blood pressure 

diastolic                               2022 

15:00:00            87 mm[Hg]                               [facility]

 

                                        height              2022 

09:00:00            61 [in_i]                               [facility]

 

                                        weight              2022 

09:00:00            220 [lb_av]                             [facility]

 

                                        bmi                 2022 

09:00:00            41.56 kg/m2                             [facility]

 

                                        height              2022 

15:40:00            61 [in_i]                               [facility]

 

                                        weight              2022 

15:40:00            220.2 [lb_av]                           [facility]

 

                                        temperature         2022 

15:40:00            98.4 [degF]                             [facility]

 

                                        bmi                 2022 

15:40:00            41.6 kg/m2                              [facility]

 

                                        oximetry            2022 

15:40:00            99 %                                    [facility]

 

                                        respiratory rate    2022 

15:40:00            16 /min                                 [facility]

 

                                                    blood pressure 

systolic                                2022 

15:40:00            127 mm[Hg]                              [facility]

 

                                                    blood pressure 

diastolic                               2022 

15:40:00            84 mm[Hg]                               [facility]

 

                                                    Systolic blood 

pressure                                2021-11-10 

18:40:00            137 mm[Hg]                              [facility]

 

                                                    Diastolic blood 

pressure                                2021-11-10 

18:40:00            87 mm[Hg]                               [facility] [facility]

 

                                        Heart rate          2021-11-10 

18:40:00            95 /min                                 [facility]

 

                                        Body temperature    2021-11-10 

18:40:00            36.61 Sarita                               [facility]

 

                                        Respiratory rate    2021-11-10 

18:40:00            17 /min                                 [facility]

 

                                                    Oxygen saturation 

in Arterial blood 

by Pulse oximetry                       2021-11-10 

18:40:00            100 /min                                [facility]

 

                                        Body height         2021-11-10 

15:53:00            152.4 cm                                [facility]

 

                                        Body weight         2021-11-10 

15:53:00            90.719 kg                               [facility]

 

                                        BMI                 2021-11-10 

15:53:00            39.06 kg/m2                             [facility]

 

                                                    Systolic blood 

pressure                                2021-10-21 

20:25:00            136 mm[Hg]                              [facility]

 

                                                    Diastolic blood 

pressure                                2021-10-21 

20:25:00            79 mm[Hg]                               [facility]

 

                                        Heart rate          2021-10-21 

20:25:00            98 /min                                 [facility]

 

                                        Body temperature    2021-10-21 

20:25:00            37.28 Sarita                               [facility]

 

                                        Respiratory rate    2021-10-21 

20:25:00            18 /min                                 [facility]

 

                                        Body height         2021-10-21 

20:25:00            152.4 cm                                [facility]

 

                                        Body weight         2021-10-21 

20:25:00            97.75 kg                                [facility]

 

                                        BMI                 2021-10-21 

20:25:00            42.09 kg/m2                             [facility]







Procedures





                                        Procedure           Date / Time 

Performed                               Performing 

Clinician                               Source

 

                                        CBC WITH DIFF       2023 

12:36:00                                Huong Plasencia                                     [facility]

 

                                                    URINE DRUG (IMMUNOASSAY) - 

COMPREHENSIVE DRUG SCREEN               2023 

03:33:00                  Nalini Stiles       [facility]

 

                                                    BASIC METABOLIC PANEL (NA, K

, 

CL, CO2, GLUCOSE, BUN, 

CREATININE, CA)                         2023 

13:58:00                                Huong Plasencia                                     [facility]

 

                                        CBC WITHOUT DIFF    2023 

13:58:00                                Huong Plasencia                                     [facility]

 

                                        LIPASE              2023 

13:53:00                                Huong Plasencia                                     [facility]

 

                                                    HEPATIC FUNCTION PANEL 

(21441) (ALB,T.PRO,BILI 

T,BU/BC,ALT,AST,ALK PHOS)               2023 

13:53:00                                Huong Plasencia                                     [facility]

 

                                        HEPATITIS B SURFACE ANTIBODY 2023 

16:56:00                  Leander Holcomb         [facility]

 

                                        HCV ANTIBODY        2023 

16:56:00                  Leander Holcomb         [facility]

 

                                        HEPATITIS B CORE ANTIBODY IGM 2023

 

16:56:00                  Leander Holcomb         [facility]

 

                                        HAV ANTIBODY (IGG AND IGM) 2023 

16:56:00                  Leander Holcomb [facility]

 

                                        US GALL BLADDER     2023 

16:06:09                  [facility]

 

                                        CT ABDOMEN PELVIS W CONTRAST 2023 

14:24:41                  [facility]

 

                                        POCT PREGNANCY TEST 2023 

13:28:00                  [facility]

 

                                        GAMMA GLUTAMYLTRANSFERASE 2023 

13:17:00                  [facility]

 

                                        LIPASE              2023 

13:17:00                  [facility]

 

                                        BILI UNCONJUGATED/BILI CONJUG 2023

 

13:17:00                  [facility]

 

                                        COMP. METABOLIC PANEL (50648) 2023

 

13:17:00                  [facility]

 

                                        CBC WITH DIFF       2023 

13:17:00                  [facility]

 

                                        URINALYSIS          2023 

13:17:00                  [facility]

 

                                                    CONSENT/REFUSAL FOR DIAGNOSI

S 

AND TREATMENT                           2023 

12:40:48                                Doctor Unassigned, 

No Name                                 [facility]

 

                                        CT ABDOMEN PELVIS W CONTRAST 2021-11-10 

17:21:22                  Stef Valdes            [facility]

 

                                        POCT PREGNANCY TEST 2021-11-10 

17:18:00                  Stef Valdes            [facility]

 

                                        LIPASE              2021-11-10 

17:05:00                  Stef Valdes            [facility]

 

                                        COMP. METABOLIC PANEL (28294) 2021-11-10

 

17:05:00                  Stef Valdes            [facility]

 

                                        CBC WITH DIFF       2021-11-10 

17:05:00                  Stef Valdes            [facility]

 

                                        URINALYSIS          2021-11-10 

17:05:00                  Stef Valdes            [facility]

 

                                                    COVID-19 (ID NOW RAPID 

TESTING)                                2021-11-10 

17:05:00                  Stef Valdes            [facility]

 

                                        NOTICE OF PRIVACY PRACTICES 2021-11-10 

15:39:28                                Doctor Unassigned, 

No Name                                 [facility]

 

                                                    CONSENT/REFUSAL FOR DIAGNOSI

S 

AND TREATMENT                           2021-11-10 

15:39:19                                Doctor Unassigned, 

No Name                                 [facility]







Encounters





                                                    Start 

Date/Time                               End 

Date/Time                               Encounter 

Type                                    Admission 

Type                                    Attending 

[facility] 

Facility                                Care 

Department                              Encounter 

ID                                      Source

 

                                                    2023 

10:43:00                        Outpatient                      Niyah Ko              [facility]              [facility]              [id number]

[id number]                                   [facility]

 

                                                    2022 

16:53:01                        Outpatient                      Niyah Ko              [facility]              [facility]              [id number]

[id number]                                   [facility]

 

                                                    2022 

08:38:02                        Outpatient                      Niyah Ko              [facility]              [facility]              [id number]

                                   [facility]

 

                                                    2022 

14:56:03                        Outpatient                      Niyah Ko              [facility]              [facility]              [id number]

[id number]                                   [facility]

 

                                                    2022 

14:37:44            Outpatient                     [facility]    [facility]    [id number]

[id number]

                                   [facility]

 

                                                    2025 

11:20:00                                2025 

11:20:00            Outpatient                              RUFINA SEQUEIRA          [id number]       [facility]

 

                                                    2024 

10:30:00                                2024 

10:30:00            Outpatient                              EDWIN MORGAN          [id number]       [facility]

 

                                                    2024 

09:00:00                                2024 

09:00:00            Outpatient                              STEFAN DUNBAR          [id number]       [facility] 

[facility]

 

                                                    2024 

00:00:00                                2024 

00:00:00            Outpatient                              DENISE HAJI          [id number]       [facility] 

[facility]

 

                                                    2024 

08:30:00                                2024 

08:30:00            Outpatient                              STEFAN DUNBAR          [id number]       [facility] 

[facility]

 

                                                    2024 

13:30:00                                2024 

13:30:00  Outpatient                     SANGE[facility] FINE    [id number] [facility] 

[facility]

 

                                                    2024 

09:40:00                                2024 

09:40:00  Outpatient                     [facility]INE    [id number] [facility] 

[facility]

 

                                                    2024-10-30 

11:30:00                                2024-10-30 

11:30:00            Outpatient                              PAULINA OLGUIN           [facility]          [id number]       [facility]

 

                                                    2024-10-29 

00:00:00                                2024-10-29 

00:00:00            Outpatient                              NISHA AREVALOA         [facility]          [id number]       [facility]

 

                                                    2024-10-16 

14:20:00                                2024-10-16 

14:20:00            Outpatient                              RUFINA SEQUEIRA        [facility]          [id number]       [facility]

 

                                                    2024 

10:00:00                                2024 

10:00:00            Outpatient                              TOMMIE AREVALO          [id number]       [facility]

 

                                                    2024 

16:10:00                                2024 

16:10:00            Outpatient                              MANAN 

JEAN CLAUDEDOROTHYCAROLYN          [facility]          [id number]       [facility]

 

                                                    2024 

14:30:00                                2024 

14:30:00            Outpatient                              EVAN 

SMITHA           [facility]          [id number]       [facility]

 

                                                    2024 

00:00:00                                2024 

00:00:00  Outpatient                     [facility]    [id number] [facility] 

[facility]

 

                                                    2024 

16:00:00                                2024 

16:00:00            Outpatient                              STEFAN DUNBAR          [id number]       [facility]

 

                                                    2024 

00:00:00                                2024 

00:00:00            Outpatient                              TOMMIE AREVALO          [id number]       [facility]

 

                                                    2024 

12:15:00                                2024 

12:15:00  Outpatient           MARIBEL FINE    [id number] [facility] 

[facility]

 

                                                    2024 

11:15:00                                2024 

11:15:00            Outpatient                              DENISE HAJI          [id number]       [facility]

 

                                                    2024 

13:30:00                                2024 

13:30:00            Outpatient                              STEFAN DUNBAR          [id number]       [facility]

 

                                                    2024 

00:00:00                                2024 

00:00:00            Outpatient                              ELLIOTT PINO          [facility]          [id number]       [facility]

 

                                                    2024 

00:00:00                                2024 

00:00:00            Outpatient                              TOMMIE AREVALO         [facility]          [id number]       [facility]

 

                                                    2024 

00:00:00                                2024 

00:00:00            Outpatient                              MD [facility] SALGADO          [id number]       [facility]

 

                                                    2024 

00:00:00                                2024 

00:00:00            Outpatient                              TOMMIE AREVALO         [facility]          [id number]       [facility]

 

                                                    20[facility] 

15:30:00                                2024 

15:30:00            Outpatient                              STEFAN DUNBAR           [facility]          [id number]       [facility]

 

                                                    2024 

00:00:00                                2024 

00:00:00            Outpatient                              TOMMIE AREVALO         [facility]          [id number]       [facility]

 

                                                    2024 

00:00:00                                2024 

00:00:00            Outpatient                              TOMMIE AREVALO         [facility]          [id number]       [facility]

 

                                                    2024 

00:00:00                                2024 

00:00:00            Outpatient                              TOMMIE AREVALO         [facility]          [id number]       [facility]

 

                                                    2024 

14:00:00                                2024 

14:00:00            Outpatient                              FREDDY KEMP          [id number]       [facility]

 

                                                    2024 

10:30:00                                2024 

10:30:00            Outpatient                              SMITHA GORDON          [id number]       [facility]

 

                                                    2024 

09:00:00                                2024 

09:00:00            Outpatient                              IRINA 

[facility]          [id number]       [facility]

 

                                                    2023 

15:15:00                                2023 

15:15:00  Outpatient           [facility]     [facility]    [id number] [facility] 

[facility]

 

                                                    2023 

14:30:00                                2023 

14:30:00            Outpatient                              TOMMIE AREVALO          [facility]          [id number]       [facility] 

[facility]

 

                                                    2023 

00:00:00                                2023 

00:00:00  Outpatient                     [facility]    [id number] [facility] 

[facility]

 

                                                    2023 

00:00:00                                2023 

00:00:00  (TEL)                         [facility]    [facility]    [id number]   [facility]

 

                                                    2023 

06:56:00                                2023 

12:49:00            Outpatient          LEROY REILLY           [facility]             [id number]      [facility]

 

                                                    2023 

06:56:00                                2023 

12:49:00            Emergency                               Leander Holcomb David                                   [facility]                                  [id number]

[id number]

[id number]

[id number]

[id number]                       [id number]                 [facility]

 

                                                    2023-10-29 

00:00:00                                2023-10-29 

00:00:00            Outpatient                              GC_GCBZW_Ka

diashleya_S            [facility]                [id number]

[id number]                                 [facility]

 

                                                    2023 

14:45:00                                2023 

14:45:00            Outpatient          CHANTELLE NGUYEN           [facility]            [id number]      [facility]

 

                                                    2022 

00:00:00                                2022 

00:00:00                                OFFICE 

VISIT EST 

PT LEVEL 3                       [facility]     [facility]     [id number]    [facility] 

[facility]

 

                                                    2022-03-10 

15:00:00                                2022-03-10 

15:00:00  Outpatient R                   [facility]      [id number] [facility]

 

                                                    2022 

00:00:00                                2022 

00:00:00                                PREV VISIT 

[age] 

18-39                            [facility]     [facility]     [id number]    [facility]

 

                                                    2022 

00:00:00                                2022 

00:00:00                                OFFICE 

VISIT 

ESTAB PT 

LEVEL 4                          [facility]     [facility]     [id number]    [facility]

 

                                                    2022 

00:00:00                                2022 

00:00:00                                OFFICE 

VISIT NEW 

PT LEVEL 4                       [facility]     [facility]     [id number]    [facility]

 

                                                    2021-12-10 

13:00:00                                2021-12-10 

13:00:00  Outpatient R                   [facility]      [id number] [facility]

 

                                                    2021 

10:00:00                                2021 

10:00:00  Outpatient R                   [facility]      [id number] [facility]

 

                                                    2021-11-10 

09:55:00                                2021-11-10 

12:47:00            Emergency           X                   STEF VALDES           [facility]            ERT             [id number]      [facility]

 

                                                    2021-11-10 

09:55:00                                2021-11-10 

12:47:00            Emergency                               Stef Valdes R                                 [facility]                                  [id number]

[id number]

[id number]

[id number]

[id number]                       [id number]                  [facility]

 

                                                    2021-11-10 

00:00:00                                2021-11-10 

00:00:00                                Orders 

Only                                                Doctor 

Unassigned, 

No Name                                 [facility]                                [id number]

[id number]

[id number]

[id number]

[id number]                       [id number]                  [facility]

 

                                                    2021 

16:30:00                                2021 

16:30:00  Outpatient R                   [facility]      [id number] [facility]

 

                                                    2021 

00:00:00                                2021 

00:00:00            Outpatient          R                   MAGDALENE PERALTA           [facility]            [id number]      [facility]

 

                                                    2021-10-21 

15:03:30                                2021-10-21 

15:59:01                                Office 

Visit                                               Magdalene Peralta                                 [facility] 

OB/GYN 

[facility] 

MATERNAL 

& CHILD 

HEALTH 

[facility]                                [id number]

[id number]

[id number]

[id number]

[id number]                       [id number]                  [facility]

 

                                                    2021-10-21 

15:15:00                                2021-10-21 

15:15:00            Outpatient          R                   MAGDALENE PERALTA           [facility]            [id number]      [facility]

 

                                                    2021 

13:05:43                                2021 

14:00:21                                Office 

Visit                                               Yris Aguirre                              [facility] 

OB/GYN 

[facility] 

& CHILD 

[facility]                                [id number]

[id number]

[id number]

[phone]

107                       [id number]                  [facility]

 

                                                    2021 

14:00:00                                2021 

14:00:00  Outpatient R                   [facility]      [id number] [facility]

 

                                                    2021 

00:00:00                                2021 

00:00:00            Refill                                  Yris Aguirre [facility] 

OB/GYN 

[facility] 

& CHILD 

[facility]                                [id number]

[id number]

4.2.7.2.686

[phone]

107                       [id number]                  [facility]

 

                                                    2021 

08:14:31                                2021 

08:59:01                                Office 

Visit                                               Yris Aguirre                              [facility] 

OB/GYN 

[facility]                                1.2.840.114

[id number]

4.2.7.2.686

[phone]

107                       [id number]                  [facility]

 

                                                    2021 

08:15:00                                2021 

08:15:00            Outpatient          R                   YRIS AGUIRRE        [facility]            [id number]      [facility]

 

                                                    2021 

00:00:00                                2021 

00:00:00            Telephone                               Richy Hernandes [facility] 

OB/GYN 

[facility] 

& CHILD 

[facility]                                1.2.840.114

[id number]

4.2.7.2.686

[phone]

107                       [id number]                  [facility]

 

                                                    2021 

00:00:00                                2021 

00:00:00            Telephone                               Yris Aguirre LEILANI                              [facility] 

OB/GYN 

[facility] 

& CHILD 

[facility]                                1..114

350.1.13.10

4.2.7.2.686

[phone]

107                       [id number]                  [facility]

 

                                                    2021 

00:00:00                                2021 

00:00:00            Telephone                               Yris Aguirre                              [facility] 

OB/GYN 

[facility] 

& CHILD 

[facility]                                1..114

350.1.13.10

4.2.7.2.686

[phone]

107                       [id number]                  [facility]

 

                                                    2021 

12:47:29                                2021 

13:54:19                                Office 

Visit                                               OtfYisel juniorwhitley DUKE                              [facility] 

OB/[facility]                                1..114

350.1.13.10

4.2.7.2.686

[phone]

107                       [id number]                  [facility]

 

                                                    2021 

13:00:00                                2021 

13:00:00            Outpatient          R                   YRIS AGUIRRE        [facility]            [id number]      [facility]

 

                                                    2021 

00:00:00                                2021 

00:00:00                                Orders 

Only                                                Doctor 

Unassigned, 

No Name                                 [facility]                                1.114

350.1.13.10

4.2.7.2.686

[phone]

009                       [id number]                  [facility]

 

                                                    2021 

16:40:26                                2021 

17:00:26                                Laboratory 

Only                                                Lab, Adc 

Fam Pob I

[facility] Healy                              [facility]

nal 

Office 

Building 

One                                     1.114

350.1.13.10

4.2.7.2.686

[phone]

044                       [id number]                  [facility]

 

                                                    2021 

16:40:00                                2021 

16:40:00            Outpatient          R                   PAU HEALY        [facility]            [id number]      [facility]

 

                                                    2021 

11:08:16                                2021 

11:28:16                                Laboratory 

Only                                                Lab, Adc 

Fam Pob Nidia Boyd                                [facility]                                     1.2.840.114

350.1.13.10

4.2.7.2.686

[phone]

044                       [id number]                  [facility]

 

                                                    2021 

11:20:00                                2021 

11:20:00            Outpatient          KIAH                   NIDIA MADDEN          [facility]            [id number]      [facility]

 

                                                    2011 

16:00:00                                2011 

13:00:00            Inpatient           GABRIELA CAMERON NATHAN              [facility]                [place]                 [id number]

5                                       [facility]

 

                                                    2011 

00:00:00                                2011 

15:26:09   Outpatient                       [facility]       [id number]

7                                       [facility]

 

                                                    2011 

08:56:00                                2011 

00:14:00            Outpatient          P                   TERRENCE WU              [facility]                [place]                 [id number]

7                                       [facility]

 

                                                    2011 

17:31:00                                2011 

00:07:00   Outpatient MP          HAMIDA PHILIP [facility]       [place]        [id number]

4                                       [facility]

 

                                                    2011 

00:00:00                                2011 

13:58:33   Outpatient                       [facility]       [id number]

5                                       [facility]

 

                                                    2011 

00:00:00                                2011 

14:58:48   Outpatient                       [facility]       [id number]

7                                       [facility]

 

                                                    2011 

00:00:00                                2011 

16:05:36   Outpatient                       [facility]       [id number]

5                                       [facility]

 

                                                    2011 

00:00:00                                2011 

14:54:43   Outpatient                       [facility]       [id number]

5                                       [facility]

 

                                                    2011 

00:00:00                                2011 

16:03:20   Outpatient                       [facility]       [id number]

1                                       [facility]

 

                                                    2011 

00:00:00                                2011 

15:08:37   Outpatient                       [facility]       [id number]

0                                       [facility]







Results





                    Test Description Test Time Test Comments Results   Result Co

mments Source







                                        

 

                                        

 

                                        

 

                                        

 

                                        





[facility]GM[facility]2023 22:31:52
  * 



                      Test Item  Value      Reference Range Interpretation Comme

[facility]

 

                      HAV Total (test code = 1303954009) Negative               

          

 

                                                    HAVT Semi-Quantitative (test

 code = 

9264040027)     1.48                                            





[facility] CenterHCV [id number] 22:31:52* 



                      Test Item  Value      Reference Range Interpretation Comme

[facility]

 

                      HCV Ab (test code = 57997-8) Negative                     

    

 

                                                    HCV Semi-Quantitative (test 

code = 

71306-4)        0.02                                            





[facility] CenterHEPATITIS B CORE ANTIBODY [id number] 
22:19:10* 



                      Test Item  Value      Reference Range Interpretation Comme

nts

 

                                                    HBCM 

Semi-Quantitative 

(test code = 

73684-3)        0.03                                            

 

                                                    EFFIE (test code = 

EFFIE)                                    Biotin has been reported 

to cause a negative bias, 

interpret results 

relative to patient's use 

of biotin.                                                  





[facility] CenterGAMMA [id number] 21:00:16* 



                      Test Item  Value      Reference Range Interpretation Comme

[facility]

 

                      GGT (test code = 2636106456) 584 U/L    13-40      H      

    

 

                                                    Lab Interpretation (test cod

e = 

83473-7)        Abnormal                                        





[facility] CenterBILI UNCONJUGATED/BILI [id number] 
20:59:55* 



                      Test Item  Value      Reference Range Interpretation Comme

nts

 

                      BILI CONJ (test code = 1611507282) 0.2 mg/dL  0.0-0.3     

          

 

                      BILI UNCON (test code = 8795750916) 0.9 mg/dL  0.1-1.1    

           

 

                                                    Lab Interpretation (test cod

e = 

75418-8)        Normal                                          





[facility] CenterCOMP[facility] MET[facility]OLIC PANEL (05621)2023 
14:20:01* 



                      Test Item  Value      Reference Range Interpretation Comme

nts

 

                                                    NA (test code = 

8729611209)     137 mmol/L      135-145                         

 

                                                    K (test code = 

4124164596)     3.7 mmol/L      3.5-5.0                         

 

                                                    CL (test code = 

4692248508)     107 mmol/L                                

 

                                                    CO2 TOTAL (test code = 

6044276207)     20 mmol/L       23-31           L               

 

                                                    AGAP (test code = 

2589363482)     10              2-16                            

 

                                                    BUN (test code = 

6264398813)     11 mg/dL        7-23                            

 

                                                    GLUCOSE (test code = 

2154131111)     120 mg/dL                 H               

 

                                                    CREATININE (test code = 

8524373751)     0.74 mg/dL      0.50-1.04                       

 

                                                    TOTAL BILI (test code = 

6591479883)     2.1 mg/dL       0.1-1.1         H               

 

                                                    CALCIUM (test code = 

0651115251)     9.1 mg/dL       8.6-10.6                        

 

                                                    T PROTEIN (test code = 

5526577127)     7.8 g/dL        6.3-8.2                         

 

                                                    ALBUMIN (test code = 

5779226086)     4.4 g/dL        3.5-5.0                         

 

                                                    ALK PHOS (test code = 

8433541965)     110 U/L                                   

 

                                                    ALTv (test code = 

1742-6)         742 U/L         5-35            H               

 

                                                    AST(SGOT) (test code = 

2934688278)     1143 U/L        13-40           H               

 

                                                    eGFR (test code = 

21555-3)        109.7           mL/min/1.73m2                   CKD-EPI eGFR 

(). Assuming 

creatinine has been 

stable day-to-day 

for at least three 

months, the eGFR 

indicates Category 

G1 (>= 90 

mL/min/1.73 m2)

 

                                                    Lab Interpretation (test 

code = 56123-0) Abnormal                                        





[facility] CenterLIPASE2023-12-[facility] 14:02:28* 



                      Test Item  Value      Reference Range Interpretation Comme

nts

 

                      LIPASE (test code = 9238259613) 280 U/L    0-220      H   

       

 

                                                    Lab Interpretation (test cod

e = 

58760-0)        Abnormal                                        





[facility] CenterCB WITH [id number] 13:43:48* 



                      Test Item  Value      Reference Range Interpretation Comme

nts

 

                                                    WBC (test code = 

6690-2)         6.23            See_Comment                     [Automated messa

ge] 

The system which 

generated this 

result transmitted 

reference range: 

4.30 - 11.10 

10*3/?L. The 

reference range was 

not used to 

interpret this 

result as 

normal/abnormal.

 

                                                    RBC (test code = 

789-8)          5.13            See_Comment                     [Automated messa

ge] 

The system which 

generated this 

result transmitted 

reference range: 

3.93 - 5.25 

10*6/?L. The 

reference range was 

not used to 

interpret this 

result as 

normal/abnormal.

 

                                                    HGB (test code = 

718-7)          15.3 g/dL       11.6-15.0       H               

 

                                                    HCT (test code = 

4544-3)         43.2 %          35.7-45.2                       

 

                                                    MCV (test code = 

787-2)          84.2 fL         80.6-95.5                       

 

                                                    MCH (test code = 

785-6)          29.8 pg         25.9-32.8                       

 

                                                    MCHC (test code = 

786-4)          35.4 g/dL       31.6-35.1       H               

 

                                                    RDW-SD (test code = 

20181-2)        34.4 fL         39.0-49.9       L               

 

                                                    RDW-CV (test code = 

788-0)          11.3 %          12.0-15.5       L               

 

                                                    PLT (test code = 

777-3)          325             See_Comment                     [Automated messa

ge] 

The system which 

generated this 

result transmitted 

reference range: 

166 - 358 10*3/?L. 

The reference range 

was not used to 

interpret this 

result as 

normal/abnormal.

 

                                                    MPV (test code = 

82176-2)        10.0 fL         9.5-12.9                        

 

                                                    NRBC/100 WBC (test 

code = 2139185467) 0.0             See_Comment                     [Automated me

ssage] 

The system which 

generated this 

result transmitted 

reference range: 

0.0 - 10.0 /100 

WBCs. The reference 

range was not used 

to interpret this 

result as 

normal/abnormal.

 

                                                    NRBC x10^3 (test code 

= 4908735296)                   See_Comment                     [Automated messa

ge] 

The system which 

generated this 

result transmitted 

reference range: 

10*3/?L. The 

reference range was 

not used to 

interpret this 

result as 

normal/abnormal.

 

                                                    GRAN MAT (NEUT) % 

(test code = 770-8) 70.5 %                                          

 

                                                    IMM GRAN % (test code 

= 2577203032)   0.20 %                                          

 

                                                    LYMPH % (test code = 

736-9)          22.5 %                                          

 

                                                    MONO % (test code = 

5905-5)         5.9 %                                           

 

                                                    EOS % (test code = 

713-8)          0.3 %                                           

 

                                                    BASO % (test code = 

706-2)          0.6 %                                           

 

                                                    GRAN MAT x10^3(ANC) 

(test code = 

9826021114)     4.39 10*3/uL    1.88-7.09                       

 

                                                    IMM GRAN x10^3 (test 

code = 6025273247)                 0.00-0.06                       

 

                                                    LYMPH x10^3 (test code 

= 731-0)        1.40 10*3/uL    1.32-3.29                       

 

                                                    MONO x10^3 (test code 

= 742-7)        0.37 10*3/uL    0.33-0.92                       

 

                                                    EOS x10^3 (test code = 

711-2)                          0.03-0.39       L               

 

                                                    BASO x10^3 (test code 

= 704-7)        0.04 10*3/uL    0.01-0.07                       

 

                                                    Lab Interpretation 

(test code = 49150-8) Abnormal                                        





[facility] CenterPOCT PREGNANCY [id number] 13:28:00* 



                      Test Item  Value      Reference Range Interpretation Comme

nts

 

                      POCT PREG (test code = 1605) Negative                     

    

 

                                                    On board controls acceptable

 

with C Line (test code = 3574) Yes                                             

 

                                                    POCT PREG LOT # (test code =

 

3575)           HCG [id number]                                  

 

                                                    POCT PREG TEST  DATE 

(test code = 3576) 2025                                      

 

                                                    Lab Interpretation (test cod

e 

= 11864-0)      Normal                                          





[facility] CenterCOM. [facility] (45030)2021-11-10 
17:47:54* 



                      Test Item  Value      Reference Range Interpretation Comme

nts

 

                                                    NA (test code = 

2429938571)     135 mmol/L      135-145                         

 

                                                    K (test code = 

5564241343)     4.4 mmol/L      3.5-5.0                         

 

                                                    CL (test code = 

5034688708)     102 mmol/L                                

 

                                                    CO2 TOTAL (test code 

= 1038382061)   26 mmol/L       23-31                           

 

                                                    AGAP (test code = 

6662213054)                     2-16                            

 

                                                    BUN (test code = 

1018866152)     11 mg/dL        7-23                            

 

                                                    GLUCOSE (test code = 

0573097121)     99 mg/dL                                  

 

                                                    CREATININE (test code 

= 2768994634)   0.64 mg/dL      0.50-1.04                       

 

                                                    TOTAL BILI (test code 

= 5848684501)   0.5 mg/dL       0.1-1.1                         

 

                                                    CALCIUM (test code = 

3748050386)     9.5 mg/dL       8.6-10.6                        

 

                                                    T PROTEIN (test code 

= 5947527781)   7.7 g/dL        6.3-8.2                         

 

                                                    ALBUMIN (test code = 

2082786714)     4.3 g/dL        3.5-5.0                         

 

                                                    ALK PHOS (test code = 

5154767454)     84 U/L                                    

 

                                                    ALTv (test code = 

1742-6)         21 U/L          5-35                            

 

                                                    AST(SGOT) (test code 

= 7245899752)   34 U/L          13-40                           

 

                                                    eGFR (test code = 

8322073250)                     mL/min/1.73m2                   

 

                                        EFFIE (test code = EFFIE) Association of 

Glomerular Filtration 

Rate (GFR) and Staging 

of Kidney Disease* 

+-----------------------

+---------------------+-

------------------------

+| GFR (mL/min/1.73 m2) 

?| With Kidney Damage ?| 

?Without Kidney 

Damage+-----------------

------+-----------------

----+-------------------

------+| ?>90 ? ? ? ? ? 

? ? ? ?| ?Stage one ? ? 

? ? ?| ? Normal ? ? ? ? 

? ? ? 

?+----------------------

-+---------------------+

------------------------

-+| ?60-89 ? ? ? ? ? ? ? 

?| ?Stage two ? ? ? ? ?| 

? Decreased GFR ? ? ? ? 

+-----------------------

+---------------------+-

------------------------

+| ?30-59 ? ? ? ? ? ? ? 

?| ?Stage three ? ? ? ?| 

? Stage three ? ? ? ? ? 

+-----------------------

+---------------------+-

------------------------

+| ?15-29 ? ? ? ? ? ? ? 

?| ?Stage four ? ? ? ? | 

? Stage four ? ? ? ? ? 

?+----------------------

-+---------------------+

------------------------

-+| ?<15 (or dialysis) ? 

?| ?Stage five ? ? ? ? | 

? Stage five ? ? ? ? ? 

?+----------------------

-+---------------------+

------------------------

-+ *Each stage assumes 

the associated GFR level 

has been in effect for 

at least three months. 

?Stages 1 to 5, with or 

without kidney disease, 

indicate chronic kidney 

disease. Notes: 

Determination of stages 

one and two (with eGFR 

>59mL/min/1.73 m2) 

requires estimation of 

kidney damage for at 

least three months as 

defined by structural or 

functional abnormalities 

of the kidney, 

manifested by 

either:Pathological 

abnormalities or Markers 

of kidney damage 

(including abnormalities 

in the composition of 

the blood or urine or 

abnormalities in imaging 

tests).                                                     





[facility] 



                      Test Item  Value      Reference Range Interpretation Comme

nts

 

                      LIPASE (test code = 8515913109) 41 U/L     0-220          

       

 

                                                    Lab Interpretation (test cod

e = 

19579-4)        Normal                                          





[facility] WITH DIFF2021-11-10 17:26:10* 



                      Test Item  Value      Reference Range Interpretation Comme

nts

 

                                                    WBC (test code = 

6690-2)                         See_Comment                     [Automated messa

ge] 

The system which 

generated this 

result transmitted 

reference range: 

4.30 - 11.10 

10*3/?L. The 

reference range was 

not used to 

interpret this 

result as 

normal/abnormal.

 

                                                    RBC (test code = 

789-8)                          See_Comment     H               [Automated messa

ge] 

The system which 

generated this 

result transmitted 

reference range: 

3.93 - 5.25 

10*6/?L. The 

reference range was 

not used to 

interpret this 

result as 

normal/abnormal.

 

                                                    HGB (test code = 

718-7)          13.3 g/dL       11.6-15.0                       

 

                                                    HCT (test code = 

4544-3)         41.9 %          35.7-45.2                       

 

                                                    MCV (test code = 

787-2)          78.9 fL         80.6-95.5       L               

 

                                                    MCH (test code = 

785-6)          25.0 pg         25.9-32.8       L               

 

                                                    MCHC (test code = 

786-4)          31.7 g/dL       31.6-35.1                       

 

                                                    RDW-SD (test code = 

21875-8)        37.3 fL         39.0-49.9       L               

 

                                                    RDW-CV (test code = 

788-0)          13.2 %          12.0-15.5                       

 

                                                    PLT (test code = 

777-3)                          See_Comment     H               [Automated messa

ge] 

The system which 

generated this 

result transmitted 

reference range: 

166 - 358 10*3/?L. 

The reference range 

was not used to 

interpret this 

result as 

normal/abnormal.

 

                                                    MPV (test code = 

62156-4)        9.2 fL          9.5-12.9        L               

 

                                                    NRBC/100 WBC (test 

code = 8698635159)                 See_Comment                     [Automated me

ssage] 

The system which 

generated this 

result transmitted 

reference range: 

0.0 - 10.0 /100 

WBCs. The reference 

range was not used 

to interpret this 

result as 

normal/abnormal.

 

                                                    NRBC x10^3 (test code 

= 2349138023)   <0.01           See_Comment                     [Automated messa

ge] 

The system which 

generated this 

result transmitted 

reference range: 

10*3/?L. The 

reference range was 

not used to 

interpret this 

result as 

normal/abnormal.

 

                                                    GRAN MAT (NEUT) % 

(test code = 770-8) 65.7 %                                          

 

                                                    IMM GRAN % (test code 

= 6422668764)   0.30 %                                          

 

                                                    LYMPH % (test code = 

736-9)          27.2 %                                          

 

                                                    MONO % (test code = 

5905-5)         5.4 %                                           

 

                                                    EOS % (test code = 

713-8)          0.8 %                                           

 

                                                    BASO % (test code = 

706-2)          0.6 %                                           

 

                                                    GRAN MAT x10^3(ANC) 

(test code = 

7350016827)     5.08 10*3/uL    1.88-7.09                       

 

                                                    IMM GRAN x10^3 (test 

code = 7548215807) <0.03           0.00-0.06                       

 

                                                    LYMPH x10^3 (test code 

= 731-0)        2.10 10*3/uL    1.32-3.29                       

 

                                                    MONO x10^3 (test code 

= 742-7)        0.42 10*3/uL    0.33-0.92                       

 

                                                    EOS x10^3 (test code = 

711-2)          0.06 10*3/uL    0.03-0.39                       

 

                                                    BASO x10^3 (test code 

= 704-7)        0.05 10*3/uL    0.01-0.07                       

 

                                                    Lab Interpretation 

(test code = 30402-5) Abnormal                                        





[facility]* 



                      Test Item  Value      Reference Range Interpretation Comme

nts

 

                      POCT PREG (test code = 1605) NEGATIVE                     

    

 

                                                    On board controls acceptable

 with C 

Line (test code = 3574) PRESENT                                         

 

                                                    Lab Interpretation (test cod

e = 

34513-3)        Normal                                          





[facility]

## 2024-12-16 NOTE — ER
Nurse's Notes                                                                                     

 [facility]                                                                 

Name: Bozena Crenshaw                                                                             

Age: 34 yrs                                                                                       

Sex: Female                                                                                       

: 1990                                                                                   

MRN: [id number]                                                                                   

Arrival Date: 2024                                                                          

Time: 17:26                                                                                       

Account#: [id number]                                                                            

Bed 19                                                                                            

Private MD:                                                                                       

Diagnosis: Other cholelithiasis without obstruction;Lower abdominal pain, unspecified             

                                                                                                  

Presentation:                                                                                     

                                                                                             

17:34 Chief complaint: EMS states: diffuse abd pain that started today after having a BM.     kc6 

      denies n/v/d. Coronavirus screen: At this time, the client does not indicate any            

      symptoms associated with coronavirus-19. Ebola Screen: No symptoms or risks identified      

      at this time. Initial Sepsis Screen: Does the patient meet any 2 criteria? No.              

      Patient's initial sepsis screen is negative. Does the patient have a suspected source       

      of infection? No. Patient's initial sepsis screen is negative. Risk Assessment: Do you      

      want to hurt yourself or someone else? Patient reports no desire to harm self or            

      others. Onset of symptoms was 2024. Care prior to arrival: Medication(s)       

      given: zofran 4 mg, Fentanyl 100mcg IV initiated. 20 GA, in the right antecubital area.     

17:34 Method Of Arrival: EMS: [facility]                                                6 

17:34 Acuity: MALIKA 3                                                                           kc6 

                                                                                                  

Triage Assessment:                                                                                

17:36 General: Appears in no apparent distress. uncomfortable, well groomed, well developed,  kc6 

      Behavior is calm, cooperative, appropriate for age. Pain: Complains of pain in abdomen      

      diffusely Pain currently is 8 out of 10 on a pain scale. EENT: No signs and/or symptoms     

      were reported regarding the EENT system. Neuro: Level of Consciousness is awake, alert,     

      obeys commands, Oriented to person, place, time, situation, Appropriate for age.            

      Cardiovascular: Capillary refill < 3 seconds. Respiratory: Airway is patent Trachea         

      midline Respiratory effort is even, unlabored, Respiratory pattern is regular,              

      symmetrical. GI: Abdomen is round non-distended, Bowel sounds present X 4 quads. Abd is     

      soft X 4 quads Abdomen is tender to palpation in right upper quadrant and left upper        

      quadrant Reports lower abdominal pain, upper abdominal pain, Patient currently denies       

      diarrhea, nausea, vomiting. : No signs and/or symptoms were reported regarding the        

      genitourinary system. Derm: No signs and/or symptoms reported regarding the                 

      dermatologic system. Skin is intact, is healthy with good turgor, Skin is pink, warm \T\    

      dry. Musculoskeletal: No signs and/or symptoms reported regarding the musculoskeletal       

      system. Circulation, motion, and sensation intact. Capillary refill < 3 seconds, Range      

      of motion: intact in all extremities.                                                       

                                                                                                  

OB/GYN:                                                                                           

17:36 LMP N/A - Irregular menses, Not pregnant                                                kc 

                                                                                                  

Historical:                                                                                       

- Allergies:                                                                                      

17:36 No Known Allergies;                                                                     kc6 

- PMHx:                                                                                           

17:36 Pancreatitis; Gallstone;                                                                kc6 

- PSHx:                                                                                           

17:36 tubal ligation;                                                                         kc6 

                                                                                                  

- Immunization history:: Adult Immunizations up to date.                                          

- Infectious Disease History:: Denies.                                                            

- Social history:: Smoking status: Patient denies any tobacco usage or history of.                

                                                                                                  

                                                                                                  

Screenin:39 [facility] ED Fall Risk Assessment (Adult) History of falling in the last 3 months,       kc6 

      including since admission No falls in past 3 months (0 pts) Confusion or Disorientation     

      No (0 pts) Intoxicated or Sedated No (0 pts) Impaired Gait No (0 pts) Mobility Assist       

      Device Used No (0 pt) Altered Elimination No (0 pt) Score/Fall Risk Level 0 - 2 = Low       

      Risk Oriented to surroundings, Maintained a safe environment. Abuse screen: Denies          

      threats or abuse. Denies injuries from another. Nutritional screening: No deficits          

      noted. Tuberculosis screening: No symptoms or risk factors identified.                      

                                                                                                  

Assessment:                                                                                       

17:39 Reassessment: please see triage.                                                        kc 

18:45 Reassessment: Patient appears in no apparent distress at this time. No changes from     [facility] 

      previously documented assessment. Patient and/or family updated on plan of care and         

      expected duration. Pain level reassessed. Patient is alert, oriented x 3, equal             

      unlabored respirations, skin warm/dry/pink. Patient states feeling better. Patient          

      states symptoms have improved.                                                              

19:15 Reassessment: Patient appears in no apparent distress at this time. Patient and/or      kj2 

      family updated on plan of care and expected duration. Pain level reassessed. Patient is     

      alert, oriented x 3, equal unlabored respirations, skin warm/dry/pink.                      

19:53 Reassessment: Patient appears in no apparent distress at this time. Patient and/or      kj2 

      family updated on plan of care and expected duration. Pain level reassessed. Patient is     

      alert, oriented x 3, equal unlabored respirations, skin warm/dry/pink.                      

                                                                                                  

Vital Signs:                                                                                      

17:34  / 92; Pulse 80; Resp 19 S; Temp 97.6(O); Pulse Ox 100% on R/A; Weight 90.72 kg   kc6 

      (R); Height 5 ft. 0 in. (R); Pain 8/10;                                                     

18:25 BP 97 / 83; Pulse 93; Resp 18 S; Pulse Ox 100% on R/A;                                  kc6 

19:15  / 82; Pulse 90; Resp 20; Temp 98; Pulse Ox 100% on R/A;                          kj2 

20:15  / 87; Pulse 84; Resp 20; Temp 98; Pulse Ox 100% ;                                kj2 

17:34 Body Mass Index 39.06 (90.72 kg, 152.4 cm)                                              kc6 

17:34 Pain Scale: Adult                                                                       kc6 

                                                                                                  

ED Course:                                                                                        

17:34 Patient arrived in ED.                                                                  kc6 

17:35 Scooter Sandoval PA is PHCP.                                                                cp  

17:35 Scooter Yeager MD is Attending Physician.                                             cp  

17:36 Triage completed.                                                                       kc6 

17:36 Arm band placed on.                                                                     kc6 

17:38 Patient has correct armband on for positive identification. Bed in low position. Call   kc6 

      light in reach. Side rails up X2. Pulse ox on. NIBP on. Door closed. Noise minimized.       

      Lights dimmed. Pillow given.                                                                

17:38 Maintain EMS IV. Dressing intact. Good blood return noted. Site clean \T\ dry. Gauge \T\    ashli
6

      site: 20G RAC. Flushed with 10 mL NS. Patient maintains SpO2 saturation greater than        

      95% on room air.                                                                            

17:39 Neetu Moss, RN is Primary Nurse.                                                 kc6 

18:25 Pregnancy Test, Urine Sent.                                                             kc6 

18:25 Urinalysis w/ reflexes Sent.                                                            kc6 

19:04 CT Abd/Pelvis - IV Contrast Only In Process Unspecified.                                EDMS

19:15 Provided Education on: call light.                                                      kj2 

19:39 Jeison Brown MD is Referral Physician.                                              cp  

19:52 No provider procedures requiring assistance completed. IV discontinued, intact,         kj2 

      bleeding controlled, No redness/swelling at site. Pressure dressing applied.                

                                                                                                  

Administered Medications:                                                                         

17:51 Drug: TORadol - Ketorolac IVP 15 mg IVP once Route: IVP; Site: right antecubital;       kc6 

18:25 Follow up: Response: No adverse reaction; Pain is decreased                             kc6 

17:51 Drug: Ondansetron IVP 4 mg IVP once; over 2 minutes Route: IVP; Site: right antecubital;kc6 

18:25 Follow up: Response: No adverse reaction; Nausea is decreased                           kc6 

17:51 Drug: NS 0.9% IV 1000 ml IV at 1 bolus Per protocol; to be given as a bolus over 60     kc6 

      minutes Route: IV; Rate: 1 bolus; Site: right antecubital;                                  

19:09 Follow up: Response: No adverse reaction; IV Status: Completed infusion; IV Intake:     kc6 

      1000ml                                                                                      

20:17 Drug: Rocephin IV 1 grams IV at calculated rate once; Given slow IV push per pharmacy   kj2 

      instructions Route: IV; Rate: calculated rate; Site: right antecubital;                     

20:17 Follow up: Response: Medication administered at discharge.                              kj2 

20:17 Drug: Dicyclomine IM 20 mg IM once Route: IM; Site: left deltoid;                       kj2 

20:17 Follow up: Response: Medication administered at discharge.                              kj2 

                                                                                                  

                                                                                                  

Medication:                                                                                       

19:52 VIS not applicable for this client.                                                     kj2 

                                                                                                  

Intake:                                                                                           

19:09 IV: 1000ml; Total: 1000ml.                                                              kc6 

                                                                                                  

Outcome:                                                                                          

19:39 Discharge ordered by MD.                                                                cp  

19:52 Discharged to home ambulatory, with family,                                             kj2 

19:52 Condition: stable                                                                           

19:52 Discharge instructions given to patient, family, Instructed on discharge instructions,      

      follow up and referral plans. Demonstrated understanding of instructions, follow-up         

      care,                                                                                       

20:16 Patient left the ED.                                                                    kj2 

                                                                                                  

Signatures:                                                                                       

Dispatcher [employer]                           EDMS                                                 

Scooter Sandoval PA PA   cp                                                   

Neetu Moss RN                   RN   kc6                                                  

Samanta Crandall RN                     RN   kj2                                                  

                                                                                                  

**************************************************************************************************

## 2024-12-16 NOTE — EDPHYS
Physician Documentation                                                                           

 [facility]                                                                 

Name: Bozena Crenshaw                                                                             

Age: 34 yrs                                                                                       

Sex: Female                                                                                       

: 1990                                                                                   

MRN: [id number]                                                                                   

Arrival Date: 2024                                                                          

Time: 17:26                                                                                       

Account#: [id number]                                                                            

Bed 19                                                                                            

Private MD:                                                                                       

ED Physician Scooter Yeager                                                                      

HPI:                                                                                              

                                                                                             

17:45 This 34 yrs old Black Female presents to ER via EMS with complaints of Abdominal Pain.  cp  

17:45 The patient presents with abdominal pain in the lower abdomen. Onset: The               cp  

      symptoms/episode began/occurred today. Associated signs and symptoms: Pertinent             

      positives: nausea, Pertinent negatives: chest pain, diarrhea, vomiting. The symptoms        

      are described as waxing/waning. Severity of pain: in the emergency department the pain      

      is unchanged despite EMS interventions.                                                     

                                                                                                  

OB/GYN:                                                                                           

17:36 LMP N/A - Irregular menses, Not pregnant                                                kc6 

                                                                                                  

Historical:                                                                                       

- Allergies:                                                                                      

17:36 No Known Allergies;                                                                     kc6 

- PMHx:                                                                                           

17:36 Pancreatitis; Gallstone;                                                                kc6 

- PSHx:                                                                                           

17:36 tubal ligation;                                                                         kc6 

                                                                                                  

- Immunization history:: Adult Immunizations up to date.                                          

- Infectious Disease History:: Denies.                                                            

- Social history:: Smoking status: Patient denies any tobacco usage or history of.                

                                                                                                  

                                                                                                  

ROS:                                                                                              

17:50 Constitutional: Negative for body aches, chills, fever, poor PO intake,                 cp  

17:50 Eyes: Negative for injury, pain, redness, and discharge,                                cp  

17:50 ENT: Negative for drainage from ear(s), ear pain, sore throat, difficulty swallowing,       

      difficulty handling secretions,                                                             

17:50 Cardiovascular: Negative for chest pain, palpitations,                                      

17:50 Respiratory: Negative for cough, shortness of breath, wheezing,                             

17:50 Abdomen/GI: Positive for abdominal pain, nausea and vomiting, Negative for diarrhea,        

      constipation,                                                                               

17:50 Back: Negative for pain at rest, pain with movement,                                    cp  

17:50 Neuro: Negative for dizziness, headache, weakness,                                          

17:50 All other systems are negative,                                                             

                                                                                                  

Exam:                                                                                             

17:55 Constitutional: The patient appears in no acute distress, alert, awake, non-toxic, well cp  

      developed, well nourished, obese, uncomfortable,                                            

17:55 Head/Face:  Normocephalic, atraumatic.                                                  cp  

17:55 Eyes: Periorbital structures: appear normal, Conjunctiva: normal, no exudate, no            

      injection, Sclera: no appreciated abnormality, Lids and lashes: appear normal,              

      bilaterally,                                                                                

17:55 ENT: External ear(s): are unremarkable, Nose: is normal, Mouth: Lips: moist, Oral           

      mucosa: moist, Posterior pharynx: Airway: no evidence of obstruction, patent,               

17:55 Chest/axilla: Inspection: normal,                                                           

17:55 Cardiovascular: Rate: normal, Rhythm: regular,                                              

17:55 Respiratory: the patient does not display signs of respiratory distress,  Respirations:     

      normal, no use of accessory muscles, no retractions, labored breathing, is not present,     

      Breath sounds: are clear throughout, no decreased breath sounds, no stridor, no             

      wheezing,                                                                                   

17:55 Abdomen/GI: Inspection: obese Bowel sounds: active, all quadrants, Palpation: soft, in      

      all quadrants, moderate abdominal tenderness, in the right upper quadrant, right lower      

      quadrant and left lower quadrant, rebound tenderness, is not appreciated, involuntary       

      guarding, is not appreciated,                                                               

17:55 Back: CVA tenderness, is absent,                                                            

                                                                                                  

Vital Signs:                                                                                      

17:34  / 92; Pulse 80; Resp 19 S; Temp 97.6(O); Pulse Ox 100% on R/A; Weight 90.72 kg   kc6 

      (R); Height 5 ft. 0 in. (R); Pain 8/10;                                                     

18:25 BP 97 / 83; Pulse 93; Resp 18 S; Pulse Ox 100% on R/A;                                  kc6 

19:15  / 82; Pulse 90; Resp 20; Temp 98; Pulse Ox 100% on R/A;                          kj2 

20:15  / 87; Pulse 84; Resp 20; Temp 98; Pulse Ox 100% ;                                kj2 

17:34 Body Mass Index 39.06 (90.72 kg, 152.4 cm)                                              kc6 

17:34 Pain Scale: Adult                                                                       kc6 

                                                                                                  

MDM:                                                                                              

17:35 Medical Screening Exam initiated                                                        cp  

18:00 Differential diagnosis: appendicitis, cholecystitis, Cholelithiasis, Endometriosis,     cp  

      gastritis, Ovarian Torsion, pancreatitis, Pelvic Inflammatory Disease, Pyelonephritis,      

      Ureterolithiasis, urinary tract infection.                                                  

19:38 Data reviewed: vital signs, nurses notes, lab test result(s), radiologic studies, CT    cp  

      scan, and as a result, I will discharge patient.                                            

19:38 I considered the following discharge prescriptions or medication management in the        

      emergency department Medications were administered in the Emergency Department. See         

      MAR. Counseling: I had a detailed discussion with the patient and/or guardian regarding     

      the historical points, exam findings, and any diagnostic results supporting the             

      discharge/admit diagnosis, lab results, radiology results, the need for outpatient          

      follow up, a general surgeon, to return to the emergency department if symptoms worsen      

      or persist or if there are any questions or concerns that arise at home. Response to        

      treatment: the patient's symptoms have mildly improved after treatment, and as a            

      result, I will discharge patient. Special discussion: Based on the patient's Hx, exam,      

      and Dx evaluation, there is no indication for emergent surgery or inpatient Tx. It is       

      understood by the patient/guardian that if the Sx's persist or worsen they need to          

      return immediately for re-evaluation.                                                       

                                                                                                  

                                                                                             

17:36 Order name: CBC with Diff; Complete Time: 18:12                                           

                                                                                             

18:12 Interpretation: Normal except: WBC 12.10; RBC 4.96.                                       

                                                                                             

17:36 Order name: CMP; Complete Time: 18:13                                                     

                                                                                             

19:15 Interpretation: GLUC 124; AST 43; Reviewed.                                               

                                                                                             

17:36 Order name: Lipase; Complete Time: 18:13                                                  

                                                                                             

18:13 Interpretation: Reviewed.                                                                 

                                                                                             

17:36 Order name: Pregnancy Test, Urine; Complete Time: 19:14                                   

                                                                                             

17:36 Order name: Urinalysis w/ reflexes; Complete Time: 19:14                                  

                                                                                             

19:14 Interpretation: Normal except: [facility] Extremely Turbid; UKET 2+; UWBC Clump Few; BYST     cp  

      Moderate.                                                                                   

16                                                                                             

18:13 Order name: CT Abd/Pelvis - IV Contrast Only; Complete Time: 19:14                        

                                                                                             

19:15 Interpretation: Report reviewed.                                                          

                                                                                             

17:36 Order name: IV Saline Lock; Complete Time: 17:40                                          

                                                                                             

17:36 Order name: Labs collected and sent; Complete Time: 17:51                                 

                                                                                             

19:16 Order name: PO challenge; Complete Time: 20:17                                          cp  

                                                                                                  

Administered Medications:                                                                         

17:51 Drug: TORadol - Ketorolac IVP 15 mg IVP once Route: IVP; Site: right antecubital;       kc6 

18:25 Follow up: Response: No adverse reaction; Pain is decreased                             kc6 

17:51 Drug: Ondansetron IVP 4 mg IVP once; over 2 minutes Route: IVP; Site: right antecubital;kc6 

18:25 Follow up: Response: No adverse reaction; Nausea is decreased                           kc6 

17:51 Drug: NS 0.9% IV 1000 ml IV at 1 bolus Per protocol; to be given as a bolus over 60     kc6 

      minutes Route: IV; Rate: 1 bolus; Site: right antecubital;                                  

19:09 Follow up: Response: No adverse reaction; IV Status: Completed infusion; IV Intake:     kc6 

      1000ml                                                                                      

20:17 Drug: Rocephin IV 1 grams IV at calculated rate once; Given slow IV push per pharmacy   kj2 

      instructions Route: IV; Rate: calculated rate; Site: right antecubital;                     

20:17 Follow up: Response: Medication administered at discharge.                              kj2 

20:17 Drug: Dicyclomine IM 20 mg IM once Route: IM; Site: left deltoid;                       kj2 

20:17 Follow up: Response: Medication administered at discharge.                              kj2 

                                                                                                  

                                                                                                  

Disposition Summary:                                                                              

24 19:39                                                                                    

Discharge Ordered                                                                                 

 Notes:       Location: Home                                                                        
  cp

      Problem: new                                                                            cp  

      Symptoms: have improved                                                                 cp  

      Condition: Stable                                                                       cp  

      Diagnosis                                                                                   

        - Other cholelithiasis without obstruction                                            cp  

        - Lower abdominal pain, unspecified                                                   cp  

      Followup:                                                                               cp  

        - With: Jeison Brown MD                                                                

        - When: 2 - 3 days                                                                         

        - Reason: gallstones                                                                       

      Discharge Instructions:                                                                     

        - Discharge Summary Sheet                                                             cp  

        - Abdominal Pain, Adult                                                               cp  

        - Cholelithiasis                                                                      cp  

      Forms:                                                                                      

        - Medication Reconciliation Form                                                      cp  

        - Antibiotic Education                                                                cp  

        - Prescription Opioid Use                                                             cp  

        - Patient Portal Instructions                                                         cp  

        - Leadership Thank You Letter                                                         cp  

      Prescriptions:                                                                              

        - Cipro 500 mg Oral tablet                                                                 

            - take 1 tablet ORAL route every 12 hours for 5 days; 10 tablet; Refills: 0,      cp  

      Product Selection Permitted                                                                 

        - Zofran 4 mg Oral Tablet                                                                  

            - take 1 tablet ORAL route every 12 hours As needed; 20 tablet; Refills: 0,       cp  

      Product Selection Permitted                                                                 

        - dicyclomine 20 mg Oral tablet                                                            

            - take 1 tablet ORAL route 4 times per day; 30 tablet; Refills: 0, Product        cp  

      Selection Permitted                                                                         

Signatures:                                                                                       

Dispatcher [employer]                           Scooter Howard PA PA cp Campbell, Kaitlyn RN                   RN   kc6                                                  

Samanta Crandall RN                     RN   kj2                                                  

                                                                                                  

**************************************************************************************************

## 2024-12-16 NOTE — RAD REPORT
EXAMINATION: CT Abdomen   Pelvis W Contrast



CLINICAL INDICATION: Female, 34 years old.  ABD PAIN



TECHNIQUE: CT abdomen and pelvis was performed, after the administration of IV contrast, as per depar
tment protocol. Axial, sagittal and coronal reconstructions were obtained. One or more of the

following dose reduction techniques were used: Automated exposure control, adjustment of the mA and k
V according to patient size, and iterative reconstruction. Unless otherwise specified, incidental

findings do not require dedicated imaging follow-up. 

 



COMPARISON: 10/15/2016





FINDINGS: 



LOWER CHEST: The visualized lung bases are clear.



LIVER: Mild fatty liver is present.  No focal lesion or biliary dilatation is seen. 



BILIARY SYSTEM: Cholelithiasis. No pericholecystic fluid or fat stranding.



SPLEEN: Normal size.  No focal lesion.



PANCREAS: No mass, ductal dilation, or jasen-pancreatic fluid.



ADRENALS: Normal; no mass.



KIDNEYS: Normal size and contour. No hydronephrosis.



URINARY BLADDER: Unremarkable.



GASTROINTESTINAL TRACT: No evidence of free air, significant intra-abdominal free fluid, bowel obstru
ction or abscess. 



APPENDIX: Normal appendix. 



LYMPH NODES: No lymphadenopathy.



MUSCULOSKELETAL: No acute or suspicious osseous abnormality.



ADDITIONAL FINDINGS: Mild diastasis recti. Small umbilical hernia containing fat..





IMPRESSION: 



No acute inflammatory changes or suspicious mass.



Diffuse hepatic steatosis.



Cholelithiasis.







Reported By: Jan Lange 

Electronically Signed:  12/16/2024 7:07 PM